# Patient Record
Sex: FEMALE | Race: WHITE | Employment: OTHER | URBAN - METROPOLITAN AREA
[De-identification: names, ages, dates, MRNs, and addresses within clinical notes are randomized per-mention and may not be internally consistent; named-entity substitution may affect disease eponyms.]

---

## 2017-01-11 ENCOUNTER — OFFICE VISIT (OUTPATIENT)
Dept: FAMILY MEDICINE CLINIC | Age: 72
End: 2017-01-11

## 2017-01-11 VITALS
OXYGEN SATURATION: 98 % | DIASTOLIC BLOOD PRESSURE: 72 MMHG | HEART RATE: 55 BPM | RESPIRATION RATE: 16 BRPM | WEIGHT: 163 LBS | SYSTOLIC BLOOD PRESSURE: 118 MMHG | HEIGHT: 66 IN | BODY MASS INDEX: 26.2 KG/M2 | TEMPERATURE: 97.9 F

## 2017-01-11 DIAGNOSIS — E78.5 DYSLIPIDEMIA: Primary | ICD-10-CM

## 2017-01-11 DIAGNOSIS — J02.9 PHARYNGITIS, UNSPECIFIED ETIOLOGY: Primary | ICD-10-CM

## 2017-01-11 LAB — S PYO AG THROAT QL: NORMAL

## 2017-01-11 PROCEDURE — 87880 STREP A ASSAY W/OPTIC: CPT | Performed by: FAMILY MEDICINE

## 2017-01-11 PROCEDURE — 99213 OFFICE O/P EST LOW 20 MIN: CPT | Performed by: FAMILY MEDICINE

## 2017-01-11 RX ORDER — ZOLPIDEM TARTRATE 10 MG/1
TABLET ORAL
Refills: 5 | COMMUNITY
Start: 2016-12-26 | End: 2017-03-23 | Stop reason: SDUPTHER

## 2017-01-11 RX ORDER — SIMVASTATIN 20 MG
20 TABLET ORAL NIGHTLY
Qty: 90 TABLET | Refills: 3 | Status: SHIPPED | OUTPATIENT
Start: 2017-01-11 | End: 2017-01-11 | Stop reason: SDUPTHER

## 2017-01-11 RX ORDER — SIMVASTATIN 20 MG
20 TABLET ORAL NIGHTLY
Qty: 90 TABLET | Refills: 3 | Status: SHIPPED | OUTPATIENT
Start: 2017-01-11 | End: 2019-09-30 | Stop reason: DRUGHIGH

## 2017-01-11 ASSESSMENT — ENCOUNTER SYMPTOMS
COUGH: 1
VOMITING: 0
RHINORRHEA: 1
SORE THROAT: 1
SHORTNESS OF BREATH: 0
DIARRHEA: 0
WHEEZING: 0
SINUS PRESSURE: 1
ABDOMINAL PAIN: 0
NAUSEA: 0
CONSTIPATION: 0

## 2017-01-16 RX ORDER — METOPROLOL SUCCINATE 50 MG/1
TABLET, EXTENDED RELEASE ORAL
Qty: 90 TABLET | OUTPATIENT
Start: 2017-01-16

## 2017-01-26 DIAGNOSIS — R12 HEARTBURN: ICD-10-CM

## 2017-01-26 RX ORDER — RANITIDINE 150 MG/1
TABLET ORAL
Qty: 90 TABLET | Refills: 2 | Status: SHIPPED | OUTPATIENT
Start: 2017-01-26 | End: 2017-03-22

## 2017-02-02 RX ORDER — MEMANTINE HYDROCHLORIDE 28 MG/1
CAPSULE, EXTENDED RELEASE ORAL
Refills: 12 | COMMUNITY
Start: 2017-01-12 | End: 2017-02-03 | Stop reason: DRUGHIGH

## 2017-02-03 ENCOUNTER — OFFICE VISIT (OUTPATIENT)
Dept: CARDIOLOGY | Age: 72
End: 2017-02-03

## 2017-02-03 VITALS
DIASTOLIC BLOOD PRESSURE: 78 MMHG | OXYGEN SATURATION: 97 % | HEIGHT: 66 IN | BODY MASS INDEX: 26.2 KG/M2 | RESPIRATION RATE: 12 BRPM | SYSTOLIC BLOOD PRESSURE: 136 MMHG | HEART RATE: 60 BPM | WEIGHT: 163 LBS

## 2017-02-03 DIAGNOSIS — R01.1 HEART MURMUR: ICD-10-CM

## 2017-02-03 DIAGNOSIS — Z86.73 HISTORY OF TRANSIENT ISCHEMIC ATTACK (TIA): ICD-10-CM

## 2017-02-03 DIAGNOSIS — I10 ESSENTIAL HYPERTENSION: Primary | ICD-10-CM

## 2017-02-03 PROCEDURE — G8427 DOCREV CUR MEDS BY ELIG CLIN: HCPCS | Performed by: INTERNAL MEDICINE

## 2017-02-03 PROCEDURE — 99213 OFFICE O/P EST LOW 20 MIN: CPT | Performed by: INTERNAL MEDICINE

## 2017-02-03 PROCEDURE — 1123F ACP DISCUSS/DSCN MKR DOCD: CPT | Performed by: INTERNAL MEDICINE

## 2017-02-03 PROCEDURE — 1036F TOBACCO NON-USER: CPT | Performed by: INTERNAL MEDICINE

## 2017-02-03 PROCEDURE — 4040F PNEUMOC VAC/ADMIN/RCVD: CPT | Performed by: INTERNAL MEDICINE

## 2017-02-03 PROCEDURE — 1090F PRES/ABSN URINE INCON ASSESS: CPT | Performed by: INTERNAL MEDICINE

## 2017-02-03 PROCEDURE — G8484 FLU IMMUNIZE NO ADMIN: HCPCS | Performed by: INTERNAL MEDICINE

## 2017-02-03 PROCEDURE — G8420 CALC BMI NORM PARAMETERS: HCPCS | Performed by: INTERNAL MEDICINE

## 2017-02-03 PROCEDURE — G8399 PT W/DXA RESULTS DOCUMENT: HCPCS | Performed by: INTERNAL MEDICINE

## 2017-02-03 PROCEDURE — 3014F SCREEN MAMMO DOC REV: CPT | Performed by: INTERNAL MEDICINE

## 2017-02-03 PROCEDURE — 3017F COLORECTAL CA SCREEN DOC REV: CPT | Performed by: INTERNAL MEDICINE

## 2017-02-03 RX ORDER — DONEPEZIL HYDROCHLORIDE 10 MG/1
TABLET, FILM COATED ORAL
Refills: 4 | COMMUNITY
Start: 2017-01-27

## 2017-02-03 RX ORDER — OMEPRAZOLE 20 MG/1
20 CAPSULE, DELAYED RELEASE ORAL DAILY
Qty: 30 CAPSULE | Refills: 0 | Status: SHIPPED | OUTPATIENT
Start: 2017-02-03 | End: 2017-02-03 | Stop reason: SDUPTHER

## 2017-02-03 RX ORDER — PROPRANOLOL HCL 60 MG
60 CAPSULE, EXTENDED RELEASE 24HR ORAL DAILY
Qty: 90 CAPSULE | Refills: 3 | Status: SHIPPED | OUTPATIENT
Start: 2017-02-03 | End: 2019-09-30 | Stop reason: ALTCHOICE

## 2017-02-03 RX ORDER — OMEPRAZOLE 20 MG/1
20 CAPSULE, DELAYED RELEASE ORAL DAILY
Qty: 90 CAPSULE | Refills: 3 | Status: SHIPPED | OUTPATIENT
Start: 2017-02-03 | End: 2017-03-22

## 2017-02-03 ASSESSMENT — ENCOUNTER SYMPTOMS
CHEST TIGHTNESS: 0
COUGH: 0
COLOR CHANGE: 0
APNEA: 0
SHORTNESS OF BREATH: 1

## 2017-03-09 RX ORDER — GABAPENTIN 100 MG/1
100 CAPSULE ORAL DAILY
Qty: 90 CAPSULE | Status: CANCELLED | OUTPATIENT
Start: 2017-03-09

## 2017-03-22 ENCOUNTER — OFFICE VISIT (OUTPATIENT)
Dept: FAMILY MEDICINE CLINIC | Age: 72
End: 2017-03-22

## 2017-03-22 VITALS
SYSTOLIC BLOOD PRESSURE: 126 MMHG | WEIGHT: 161.6 LBS | TEMPERATURE: 97.8 F | HEART RATE: 64 BPM | DIASTOLIC BLOOD PRESSURE: 84 MMHG | BODY MASS INDEX: 26.28 KG/M2 | OXYGEN SATURATION: 94 %

## 2017-03-22 DIAGNOSIS — J01.00 ACUTE NON-RECURRENT MAXILLARY SINUSITIS: Primary | ICD-10-CM

## 2017-03-22 DIAGNOSIS — R41.3 MEMORY LOSS: ICD-10-CM

## 2017-03-22 DIAGNOSIS — H81.10 BPPV (BENIGN PAROXYSMAL POSITIONAL VERTIGO), UNSPECIFIED LATERALITY: ICD-10-CM

## 2017-03-22 PROCEDURE — 1123F ACP DISCUSS/DSCN MKR DOCD: CPT | Performed by: FAMILY MEDICINE

## 2017-03-22 PROCEDURE — G8399 PT W/DXA RESULTS DOCUMENT: HCPCS | Performed by: FAMILY MEDICINE

## 2017-03-22 PROCEDURE — 1090F PRES/ABSN URINE INCON ASSESS: CPT | Performed by: FAMILY MEDICINE

## 2017-03-22 PROCEDURE — 99214 OFFICE O/P EST MOD 30 MIN: CPT | Performed by: FAMILY MEDICINE

## 2017-03-22 PROCEDURE — 1036F TOBACCO NON-USER: CPT | Performed by: FAMILY MEDICINE

## 2017-03-22 PROCEDURE — 3017F COLORECTAL CA SCREEN DOC REV: CPT | Performed by: FAMILY MEDICINE

## 2017-03-22 PROCEDURE — G8484 FLU IMMUNIZE NO ADMIN: HCPCS | Performed by: FAMILY MEDICINE

## 2017-03-22 PROCEDURE — 3014F SCREEN MAMMO DOC REV: CPT | Performed by: FAMILY MEDICINE

## 2017-03-22 PROCEDURE — 4040F PNEUMOC VAC/ADMIN/RCVD: CPT | Performed by: FAMILY MEDICINE

## 2017-03-22 PROCEDURE — G8420 CALC BMI NORM PARAMETERS: HCPCS | Performed by: FAMILY MEDICINE

## 2017-03-22 PROCEDURE — G8427 DOCREV CUR MEDS BY ELIG CLIN: HCPCS | Performed by: FAMILY MEDICINE

## 2017-03-22 RX ORDER — AMOXICILLIN AND CLAVULANATE POTASSIUM 875; 125 MG/1; MG/1
1 TABLET, FILM COATED ORAL 2 TIMES DAILY
Qty: 20 TABLET | Refills: 0 | Status: SHIPPED | OUTPATIENT
Start: 2017-03-22 | End: 2017-04-01

## 2017-03-22 ASSESSMENT — ENCOUNTER SYMPTOMS
DIARRHEA: 0
SINUS PRESSURE: 1
ABDOMINAL PAIN: 0
SHORTNESS OF BREATH: 0
CONSTIPATION: 0
COUGH: 1
WHEEZING: 0
SORE THROAT: 0
RHINORRHEA: 1

## 2017-03-28 ENCOUNTER — OFFICE VISIT (OUTPATIENT)
Dept: PULMONOLOGY | Age: 72
End: 2017-03-28

## 2017-03-28 VITALS
HEIGHT: 65 IN | HEART RATE: 66 BPM | RESPIRATION RATE: 16 BRPM | SYSTOLIC BLOOD PRESSURE: 134 MMHG | WEIGHT: 165 LBS | OXYGEN SATURATION: 95 % | TEMPERATURE: 98.3 F | BODY MASS INDEX: 27.49 KG/M2 | DIASTOLIC BLOOD PRESSURE: 80 MMHG

## 2017-03-28 DIAGNOSIS — R05.3 CHRONIC COUGH: ICD-10-CM

## 2017-03-28 DIAGNOSIS — F51.04 PSYCHOPHYSIOLOGICAL INSOMNIA: Primary | ICD-10-CM

## 2017-03-28 DIAGNOSIS — K21.9 GASTROESOPHAGEAL REFLUX DISEASE, ESOPHAGITIS PRESENCE NOT SPECIFIED: ICD-10-CM

## 2017-03-28 PROCEDURE — 1036F TOBACCO NON-USER: CPT | Performed by: INTERNAL MEDICINE

## 2017-03-28 PROCEDURE — 1123F ACP DISCUSS/DSCN MKR DOCD: CPT | Performed by: INTERNAL MEDICINE

## 2017-03-28 PROCEDURE — 4040F PNEUMOC VAC/ADMIN/RCVD: CPT | Performed by: INTERNAL MEDICINE

## 2017-03-28 PROCEDURE — G8399 PT W/DXA RESULTS DOCUMENT: HCPCS | Performed by: INTERNAL MEDICINE

## 2017-03-28 PROCEDURE — 3014F SCREEN MAMMO DOC REV: CPT | Performed by: INTERNAL MEDICINE

## 2017-03-28 PROCEDURE — G8427 DOCREV CUR MEDS BY ELIG CLIN: HCPCS | Performed by: INTERNAL MEDICINE

## 2017-03-28 PROCEDURE — 1090F PRES/ABSN URINE INCON ASSESS: CPT | Performed by: INTERNAL MEDICINE

## 2017-03-28 PROCEDURE — 99214 OFFICE O/P EST MOD 30 MIN: CPT | Performed by: INTERNAL MEDICINE

## 2017-03-28 PROCEDURE — G8420 CALC BMI NORM PARAMETERS: HCPCS | Performed by: INTERNAL MEDICINE

## 2017-03-28 PROCEDURE — G8484 FLU IMMUNIZE NO ADMIN: HCPCS | Performed by: INTERNAL MEDICINE

## 2017-03-28 PROCEDURE — 3017F COLORECTAL CA SCREEN DOC REV: CPT | Performed by: INTERNAL MEDICINE

## 2017-03-28 RX ORDER — ESOMEPRAZOLE MAGNESIUM 40 MG/1
40 CAPSULE, DELAYED RELEASE ORAL DAILY
Qty: 30 CAPSULE | Refills: 5 | Status: SHIPPED | OUTPATIENT
Start: 2017-03-28 | End: 2017-05-27 | Stop reason: SDUPTHER

## 2017-03-28 RX ORDER — ZOLPIDEM TARTRATE 10 MG/1
10 TABLET ORAL NIGHTLY
Qty: 30 TABLET | Refills: 5 | Status: SHIPPED | OUTPATIENT
Start: 2017-03-28 | End: 2017-04-27

## 2017-03-28 RX ORDER — MEMANTINE HYDROCHLORIDE 10 MG/1
TABLET ORAL
COMMUNITY
Start: 2017-03-13 | End: 2019-09-30 | Stop reason: ALTCHOICE

## 2017-03-28 ASSESSMENT — ENCOUNTER SYMPTOMS
DIARRHEA: 0
WHEEZING: 0
SINUS PRESSURE: 0
COUGH: 1
SORE THROAT: 0
CHEST TIGHTNESS: 1
RHINORRHEA: 0
VOMITING: 0
ABDOMINAL PAIN: 0
SHORTNESS OF BREATH: 0
NAUSEA: 0

## 2017-04-12 RX ORDER — GABAPENTIN 300 MG/1
300 CAPSULE ORAL 3 TIMES DAILY
Qty: 270 CAPSULE | Refills: 3 | Status: SHIPPED | OUTPATIENT
Start: 2017-04-12

## 2017-04-12 RX ORDER — GABAPENTIN 100 MG/1
100 CAPSULE ORAL DAILY
Qty: 90 CAPSULE | Refills: 3 | Status: SHIPPED | OUTPATIENT
Start: 2017-04-12 | End: 2019-09-30 | Stop reason: ALTCHOICE

## 2017-05-27 ENCOUNTER — OFFICE VISIT (OUTPATIENT)
Dept: FAMILY MEDICINE CLINIC | Age: 72
End: 2017-05-27

## 2017-05-27 VITALS
SYSTOLIC BLOOD PRESSURE: 134 MMHG | HEART RATE: 54 BPM | DIASTOLIC BLOOD PRESSURE: 86 MMHG | WEIGHT: 164.2 LBS | OXYGEN SATURATION: 98 % | TEMPERATURE: 97.6 F | BODY MASS INDEX: 27.32 KG/M2

## 2017-05-27 DIAGNOSIS — R05.3 CHRONIC COUGH: ICD-10-CM

## 2017-05-27 DIAGNOSIS — F41.8 DEPRESSION WITH ANXIETY: Primary | ICD-10-CM

## 2017-05-27 DIAGNOSIS — K21.9 GASTROESOPHAGEAL REFLUX DISEASE, ESOPHAGITIS PRESENCE NOT SPECIFIED: ICD-10-CM

## 2017-05-27 PROCEDURE — 99214 OFFICE O/P EST MOD 30 MIN: CPT | Performed by: FAMILY MEDICINE

## 2017-05-27 PROCEDURE — G8420 CALC BMI NORM PARAMETERS: HCPCS | Performed by: FAMILY MEDICINE

## 2017-05-27 PROCEDURE — 3014F SCREEN MAMMO DOC REV: CPT | Performed by: FAMILY MEDICINE

## 2017-05-27 PROCEDURE — G8399 PT W/DXA RESULTS DOCUMENT: HCPCS | Performed by: FAMILY MEDICINE

## 2017-05-27 PROCEDURE — 4040F PNEUMOC VAC/ADMIN/RCVD: CPT | Performed by: FAMILY MEDICINE

## 2017-05-27 PROCEDURE — 1123F ACP DISCUSS/DSCN MKR DOCD: CPT | Performed by: FAMILY MEDICINE

## 2017-05-27 PROCEDURE — G8427 DOCREV CUR MEDS BY ELIG CLIN: HCPCS | Performed by: FAMILY MEDICINE

## 2017-05-27 PROCEDURE — 1090F PRES/ABSN URINE INCON ASSESS: CPT | Performed by: FAMILY MEDICINE

## 2017-05-27 PROCEDURE — 1036F TOBACCO NON-USER: CPT | Performed by: FAMILY MEDICINE

## 2017-05-27 PROCEDURE — 3017F COLORECTAL CA SCREEN DOC REV: CPT | Performed by: FAMILY MEDICINE

## 2017-05-27 RX ORDER — DULOXETIN HYDROCHLORIDE 30 MG/1
30 CAPSULE, DELAYED RELEASE ORAL DAILY
Qty: 30 CAPSULE | Refills: 3 | Status: SHIPPED | OUTPATIENT
Start: 2017-05-27 | End: 2017-07-12

## 2017-05-27 RX ORDER — ESOMEPRAZOLE MAGNESIUM 40 MG/1
40 CAPSULE, DELAYED RELEASE ORAL DAILY
Qty: 30 CAPSULE | Refills: 5 | Status: SHIPPED | OUTPATIENT
Start: 2017-05-27 | End: 2019-09-30 | Stop reason: DRUGHIGH

## 2017-06-03 ASSESSMENT — ENCOUNTER SYMPTOMS
SORE THROAT: 0
DIARRHEA: 0
RHINORRHEA: 0
SHORTNESS OF BREATH: 0
COUGH: 1
ABDOMINAL PAIN: 0
CONSTIPATION: 0
WHEEZING: 0

## 2017-07-07 ENCOUNTER — NURSE ONLY (OUTPATIENT)
Dept: FAMILY MEDICINE CLINIC | Age: 72
End: 2017-07-07

## 2017-07-07 ENCOUNTER — HOSPITAL ENCOUNTER (OUTPATIENT)
Age: 72
Setting detail: SPECIMEN
Discharge: HOME | End: 2017-07-07
Payer: COMMERCIAL

## 2017-07-07 DIAGNOSIS — E78.5 HYPERLIPIDEMIA, UNSPECIFIED HYPERLIPIDEMIA TYPE: ICD-10-CM

## 2017-07-07 DIAGNOSIS — I10 ESSENTIAL HYPERTENSION: ICD-10-CM

## 2017-07-07 DIAGNOSIS — I10 ESSENTIAL HYPERTENSION: Primary | ICD-10-CM

## 2017-07-07 LAB
ALBUMIN SERPL-MCNC: 3.9 G/DL (ref 3.9–4.9)
ALP BLD-CCNC: 88 U/L (ref 40–130)
ALT SERPL-CCNC: 24 U/L (ref 0–33)
ANION GAP SERPL CALCULATED.3IONS-SCNC: 11 MEQ/L (ref 7–13)
AST SERPL-CCNC: 30 U/L (ref 0–35)
BILIRUB SERPL-MCNC: 0.6 MG/DL (ref 0–1.2)
BUN BLDV-MCNC: 25 MG/DL (ref 8–23)
CALCIUM SERPL-MCNC: 9 MG/DL (ref 8.6–10.2)
CHLORIDE BLD-SCNC: 99 MEQ/L (ref 98–107)
CHOLESTEROL, TOTAL: 174 MG/DL (ref 0–199)
CO2: 25 MEQ/L (ref 22–29)
CREAT SERPL-MCNC: 0.81 MG/DL (ref 0.5–0.9)
GFR AFRICAN AMERICAN: >60
GFR NON-AFRICAN AMERICAN: >60
GLOBULIN: 2.8 G/DL (ref 2.3–3.5)
GLUCOSE BLD-MCNC: 103 MG/DL (ref 74–109)
HCT VFR BLD CALC: 44 % (ref 37–47)
HDLC SERPL-MCNC: 42 MG/DL (ref 40–59)
HEMOGLOBIN: 14.5 G/DL (ref 12–16)
LDL CHOLESTEROL CALCULATED: 97 MG/DL (ref 0–129)
MCH RBC QN AUTO: 31.5 PG (ref 27–31.3)
MCHC RBC AUTO-ENTMCNC: 32.9 % (ref 33–37)
MCV RBC AUTO: 95.8 FL (ref 82–100)
PDW BLD-RTO: 13.4 % (ref 11.5–14.5)
PLATELET # BLD: 149 K/UL (ref 130–400)
POTASSIUM SERPL-SCNC: 4.9 MEQ/L (ref 3.5–5.1)
RBC # BLD: 4.6 M/UL (ref 4.2–5.4)
SODIUM BLD-SCNC: 135 MEQ/L (ref 132–144)
TOTAL PROTEIN: 6.7 G/DL (ref 6.4–8.1)
TRIGL SERPL-MCNC: 177 MG/DL (ref 0–200)
WBC # BLD: 4 K/UL (ref 4.8–10.8)

## 2017-07-07 PROCEDURE — 85027 COMPLETE CBC AUTOMATED: CPT

## 2017-07-07 PROCEDURE — 80061 LIPID PANEL: CPT

## 2017-07-07 PROCEDURE — 36415 COLL VENOUS BLD VENIPUNCTURE: CPT | Performed by: FAMILY MEDICINE

## 2017-07-07 PROCEDURE — 80053 COMPREHEN METABOLIC PANEL: CPT

## 2017-07-12 ENCOUNTER — OFFICE VISIT (OUTPATIENT)
Dept: FAMILY MEDICINE CLINIC | Age: 72
End: 2017-07-12

## 2017-07-12 VITALS
DIASTOLIC BLOOD PRESSURE: 60 MMHG | HEART RATE: 56 BPM | OXYGEN SATURATION: 96 % | BODY MASS INDEX: 25.71 KG/M2 | WEIGHT: 160 LBS | HEIGHT: 66 IN | TEMPERATURE: 98.2 F | SYSTOLIC BLOOD PRESSURE: 128 MMHG

## 2017-07-12 DIAGNOSIS — Z12.31 SCREENING MAMMOGRAM, ENCOUNTER FOR: ICD-10-CM

## 2017-07-12 DIAGNOSIS — Z13.31 POSITIVE DEPRESSION SCREENING: ICD-10-CM

## 2017-07-12 DIAGNOSIS — K21.9 GASTROESOPHAGEAL REFLUX DISEASE, ESOPHAGITIS PRESENCE NOT SPECIFIED: ICD-10-CM

## 2017-07-12 DIAGNOSIS — S46.812A TRAPEZIUS STRAIN, LEFT, INITIAL ENCOUNTER: Primary | ICD-10-CM

## 2017-07-12 PROCEDURE — G8399 PT W/DXA RESULTS DOCUMENT: HCPCS | Performed by: FAMILY MEDICINE

## 2017-07-12 PROCEDURE — 1123F ACP DISCUSS/DSCN MKR DOCD: CPT | Performed by: FAMILY MEDICINE

## 2017-07-12 PROCEDURE — 99213 OFFICE O/P EST LOW 20 MIN: CPT | Performed by: FAMILY MEDICINE

## 2017-07-12 PROCEDURE — 1036F TOBACCO NON-USER: CPT | Performed by: FAMILY MEDICINE

## 2017-07-12 PROCEDURE — G8431 POS CLIN DEPRES SCRN F/U DOC: HCPCS | Performed by: FAMILY MEDICINE

## 2017-07-12 PROCEDURE — 1090F PRES/ABSN URINE INCON ASSESS: CPT | Performed by: FAMILY MEDICINE

## 2017-07-12 PROCEDURE — G8419 CALC BMI OUT NRM PARAM NOF/U: HCPCS | Performed by: FAMILY MEDICINE

## 2017-07-12 PROCEDURE — 4040F PNEUMOC VAC/ADMIN/RCVD: CPT | Performed by: FAMILY MEDICINE

## 2017-07-12 PROCEDURE — 3017F COLORECTAL CA SCREEN DOC REV: CPT | Performed by: FAMILY MEDICINE

## 2017-07-12 PROCEDURE — G0444 DEPRESSION SCREEN ANNUAL: HCPCS | Performed by: FAMILY MEDICINE

## 2017-07-12 PROCEDURE — G8427 DOCREV CUR MEDS BY ELIG CLIN: HCPCS | Performed by: FAMILY MEDICINE

## 2017-07-12 PROCEDURE — 3014F SCREEN MAMMO DOC REV: CPT | Performed by: FAMILY MEDICINE

## 2017-07-12 RX ORDER — DULOXETIN HYDROCHLORIDE 60 MG/1
60 CAPSULE, DELAYED RELEASE ORAL DAILY
Qty: 30 CAPSULE | Refills: 3 | Status: SHIPPED | OUTPATIENT
Start: 2017-07-12 | End: 2017-12-03 | Stop reason: SDUPTHER

## 2017-07-12 RX ORDER — NAPROXEN 500 MG/1
500 TABLET ORAL 2 TIMES DAILY WITH MEALS
Qty: 40 TABLET | Refills: 0 | Status: SHIPPED | OUTPATIENT
Start: 2017-07-12 | End: 2019-09-30 | Stop reason: ALTCHOICE

## 2017-07-12 RX ORDER — OMEPRAZOLE 20 MG/1
20 TABLET, DELAYED RELEASE ORAL DAILY
Qty: 30 TABLET | Refills: 3 | Status: SHIPPED | OUTPATIENT
Start: 2017-07-12 | End: 2019-09-30 | Stop reason: DRUGHIGH

## 2017-07-12 ASSESSMENT — ENCOUNTER SYMPTOMS
RHINORRHEA: 0
CONSTIPATION: 0
SHORTNESS OF BREATH: 0
ABDOMINAL PAIN: 0
DIARRHEA: 0
SORE THROAT: 0
WHEEZING: 0
COUGH: 0

## 2017-07-12 ASSESSMENT — PATIENT HEALTH QUESTIONNAIRE - PHQ9
8. MOVING OR SPEAKING SO SLOWLY THAT OTHER PEOPLE COULD HAVE NOTICED. OR THE OPPOSITE, BEING SO FIGETY OR RESTLESS THAT YOU HAVE BEEN MOVING AROUND A LOT MORE THAN USUAL: 0
4. FEELING TIRED OR HAVING LITTLE ENERGY: 3
10. IF YOU CHECKED OFF ANY PROBLEMS, HOW DIFFICULT HAVE THESE PROBLEMS MADE IT FOR YOU TO DO YOUR WORK, TAKE CARE OF THINGS AT HOME, OR GET ALONG WITH OTHER PEOPLE: 2
5. POOR APPETITE OR OVEREATING: 2
7. TROUBLE CONCENTRATING ON THINGS, SUCH AS READING THE NEWSPAPER OR WATCHING TELEVISION: 1
2. FEELING DOWN, DEPRESSED OR HOPELESS: 1
1. LITTLE INTEREST OR PLEASURE IN DOING THINGS: 3
9. THOUGHTS THAT YOU WOULD BE BETTER OFF DEAD, OR OF HURTING YOURSELF: 1
3. TROUBLE FALLING OR STAYING ASLEEP: 1
SUM OF ALL RESPONSES TO PHQ QUESTIONS 1-9: 12
6. FEELING BAD ABOUT YOURSELF - OR THAT YOU ARE A FAILURE OR HAVE LET YOURSELF OR YOUR FAMILY DOWN: 0
SUM OF ALL RESPONSES TO PHQ9 QUESTIONS 1 & 2: 4

## 2017-07-18 ENCOUNTER — HOSPITAL ENCOUNTER (OUTPATIENT)
Dept: WOMENS IMAGING | Age: 72
Discharge: HOME | End: 2017-07-18
Payer: COMMERCIAL

## 2017-07-18 DIAGNOSIS — Z12.31 SCREENING MAMMOGRAM, ENCOUNTER FOR: ICD-10-CM

## 2017-07-18 PROCEDURE — G0202 SCR MAMMO BI INCL CAD: HCPCS

## 2017-07-18 RX ORDER — LOSARTAN POTASSIUM 50 MG/1
TABLET ORAL
Qty: 180 TABLET | Refills: 1 | Status: SHIPPED | OUTPATIENT
Start: 2017-07-18 | End: 2018-01-14 | Stop reason: SDUPTHER

## 2017-09-18 ENCOUNTER — HOSPITAL ENCOUNTER (OUTPATIENT)
Dept: PHYSICAL THERAPY | Age: 72
Setting detail: THERAPIES SERIES
Discharge: HOME | End: 2017-09-18
Payer: COMMERCIAL

## 2017-09-18 PROCEDURE — G8979 MOBILITY GOAL STATUS: HCPCS

## 2017-09-18 PROCEDURE — 97110 THERAPEUTIC EXERCISES: CPT

## 2017-09-18 PROCEDURE — 97162 PT EVAL MOD COMPLEX 30 MIN: CPT

## 2017-09-18 PROCEDURE — G8978 MOBILITY CURRENT STATUS: HCPCS

## 2017-09-18 ASSESSMENT — PAIN SCALES - GENERAL: PAINLEVEL_OUTOF10: 8

## 2017-09-18 ASSESSMENT — PAIN DESCRIPTION - PAIN TYPE: TYPE: ACUTE PAIN

## 2017-09-18 ASSESSMENT — PAIN DESCRIPTION - ORIENTATION: ORIENTATION: LEFT

## 2017-09-18 ASSESSMENT — PAIN DESCRIPTION - DESCRIPTORS: DESCRIPTORS: ACHING;SHARP

## 2017-09-18 ASSESSMENT — PAIN DESCRIPTION - LOCATION: LOCATION: LEG;BACK

## 2017-09-19 ENCOUNTER — HOSPITAL ENCOUNTER (OUTPATIENT)
Dept: PHYSICAL THERAPY | Age: 72
Setting detail: THERAPIES SERIES
Discharge: HOME | End: 2017-09-19
Payer: COMMERCIAL

## 2017-09-19 PROCEDURE — 97110 THERAPEUTIC EXERCISES: CPT

## 2017-09-19 PROCEDURE — 97140 MANUAL THERAPY 1/> REGIONS: CPT

## 2017-09-19 ASSESSMENT — PAIN DESCRIPTION - ORIENTATION: ORIENTATION: LEFT

## 2017-09-19 ASSESSMENT — PAIN SCALES - GENERAL: PAINLEVEL_OUTOF10: 5

## 2017-09-19 ASSESSMENT — PAIN DESCRIPTION - LOCATION: LOCATION: BACK;LEG

## 2017-09-20 DIAGNOSIS — F51.04 PSYCHOPHYSIOLOGICAL INSOMNIA: Primary | ICD-10-CM

## 2017-09-25 RX ORDER — ZOLPIDEM TARTRATE 10 MG/1
TABLET ORAL
Qty: 30 TABLET | Refills: 3 | Status: SHIPPED | OUTPATIENT
Start: 2017-09-25 | End: 2018-01-23 | Stop reason: SDUPTHER

## 2017-09-27 ENCOUNTER — HOSPITAL ENCOUNTER (OUTPATIENT)
Dept: PHYSICAL THERAPY | Age: 72
Setting detail: THERAPIES SERIES
Discharge: HOME | End: 2017-09-27
Payer: COMMERCIAL

## 2017-10-31 ENCOUNTER — OFFICE VISIT (OUTPATIENT)
Dept: PULMONOLOGY | Age: 72
End: 2017-10-31

## 2017-10-31 VITALS
OXYGEN SATURATION: 95 % | DIASTOLIC BLOOD PRESSURE: 70 MMHG | HEIGHT: 66 IN | SYSTOLIC BLOOD PRESSURE: 124 MMHG | WEIGHT: 170 LBS | BODY MASS INDEX: 27.32 KG/M2 | TEMPERATURE: 98 F | HEART RATE: 58 BPM

## 2017-10-31 DIAGNOSIS — R05.3 CHRONIC COUGH: ICD-10-CM

## 2017-10-31 DIAGNOSIS — G47.00 INSOMNIA, UNSPECIFIED TYPE: Primary | ICD-10-CM

## 2017-10-31 PROCEDURE — 3014F SCREEN MAMMO DOC REV: CPT | Performed by: PHYSICIAN ASSISTANT

## 2017-10-31 PROCEDURE — 1090F PRES/ABSN URINE INCON ASSESS: CPT | Performed by: PHYSICIAN ASSISTANT

## 2017-10-31 PROCEDURE — G8417 CALC BMI ABV UP PARAM F/U: HCPCS | Performed by: PHYSICIAN ASSISTANT

## 2017-10-31 PROCEDURE — 1123F ACP DISCUSS/DSCN MKR DOCD: CPT | Performed by: PHYSICIAN ASSISTANT

## 2017-10-31 PROCEDURE — 3017F COLORECTAL CA SCREEN DOC REV: CPT | Performed by: PHYSICIAN ASSISTANT

## 2017-10-31 PROCEDURE — G8399 PT W/DXA RESULTS DOCUMENT: HCPCS | Performed by: PHYSICIAN ASSISTANT

## 2017-10-31 PROCEDURE — G8484 FLU IMMUNIZE NO ADMIN: HCPCS | Performed by: PHYSICIAN ASSISTANT

## 2017-10-31 PROCEDURE — G8427 DOCREV CUR MEDS BY ELIG CLIN: HCPCS | Performed by: PHYSICIAN ASSISTANT

## 2017-10-31 PROCEDURE — 99213 OFFICE O/P EST LOW 20 MIN: CPT | Performed by: PHYSICIAN ASSISTANT

## 2017-10-31 PROCEDURE — 4040F PNEUMOC VAC/ADMIN/RCVD: CPT | Performed by: PHYSICIAN ASSISTANT

## 2017-10-31 PROCEDURE — 1036F TOBACCO NON-USER: CPT | Performed by: PHYSICIAN ASSISTANT

## 2017-10-31 RX ORDER — ACETAMINOPHEN 325 MG/1
650 TABLET ORAL EVERY 6 HOURS PRN
COMMUNITY

## 2017-10-31 RX ORDER — PANTOPRAZOLE SODIUM 40 MG/1
40 TABLET, DELAYED RELEASE ORAL DAILY
COMMUNITY
End: 2019-09-30 | Stop reason: DRUGHIGH

## 2017-10-31 ASSESSMENT — ENCOUNTER SYMPTOMS
SORE THROAT: 0
SINUS PRESSURE: 0
ABDOMINAL PAIN: 0
RHINORRHEA: 0
COUGH: 1
SINUS PAIN: 0
BACK PAIN: 1

## 2017-10-31 NOTE — PROGRESS NOTES
[address] [facility] 71 y.o. female presents 10/31/17 with   Chief Complaint   Patient presents with    Insomnia     follow up     HPI  This is a 71year old patient of Dr. Wign Caceres that he has been seeing for insomnia and still reports chronic cough. Patient is a non smoker was was exposed to second hand smoke years ago as a child. Patient has been on ambien for a long time and this is the only way she can sleep at bedtime. In addition, patient also reports some left posterior knee pain and reports that she had a baker's cyst in the past. Patient did however travel on a plane ride about 2 weeks ago and states that this was a 4 hour plane ride. Reviewed the following history:    Past Medical History:   Diagnosis Date    Allergic rhinitis 10/24/2011    At risk for bone density loss 2009    hx normal    Back pain     Broken ankle 2/2015    left    Colon polyps     colonoscopy 2008 (polyps) and 1/12 neg.  Cough 5/11/2015    DJD (degenerative joint disease) of cervical spine     also r shoulder    FH: colon cancer maternal age 68    Gout     on chronic Mobic! DC'ed mobic 12/11.  Heart murmur     lifelong-echo ok    History of bone density study approx 2009    Hyperlipidemia     2009, hx good control.  Hypertension     age 28, hx good control    Knee pain     right knee    Memory loss     onset august, 2011    Neuropathy [facility])     feet left > right    Osteoarthrosis 2/2/2012    Raynaud disease     Raynaud's phenomenon 2/2/2012    TIA (transient ischemic attack) 2009    transient los vision uscarotids, CT OK. Rx asa c no reoccurence. Past Surgical History:   Procedure Laterality Date    BACK SURGERY  160093    Laminectomy L5?    CERVICAL DISCECTOMY      pt denies surgery    COLONOSCOPY      having Mon 12/05/2011-neg. hx polyps    COLONOSCOPY      maternal colon ca; pt has hx polyps.     SHOULDER SURGERY      right shoulder    TOE SURGERY      joint replacement, great toe, mouth daily 30 tablet 11    melatonin 3 MG TABS tablet Take 1 tablet by mouth daily 30 tablet 3    fluocinonide (LIDEX) 0.05 % cream Apply topically 2 times daily. 3 Tube 3    omeprazole (PRILOSEC OTC) 20 MG tablet Take 1 tablet by mouth daily 30 tablet 3    naproxen (NAPROSYN) 500 MG tablet Take 1 tablet by mouth 2 times daily (with meals) 40 tablet 0    esomeprazole (NEXIUM) 40 MG delayed release capsule Take 1 capsule by mouth daily 30 capsule 5    donepezil (ARICEPT) 10 MG tablet TAKE 1 TABLET BY MOUTH DAILY  4    fluticasone (FLONASE) 50 MCG/ACT nasal spray 1 spray by Nasal route daily 1 Bottle 3    Glycopyrrolate (ROBINUL-FORTE PO) Take by mouth Taking 1/4 tablet daily. No current facility-administered medications for this visit. Review of Systems   HENT: Negative for postnasal drip, rhinorrhea, sinus pain, sinus pressure, sneezing and sore throat. Eyes: Negative for visual disturbance. Respiratory: Positive for cough (chronic and dry for 9 months). Cardiovascular: Negative for chest pain. Gastrointestinal: Negative for abdominal pain. Endocrine: Negative for polyuria. Genitourinary: Negative for dysuria. Musculoskeletal: Positive for arthralgias (left posterior leg pain), back pain and joint swelling (left knee posterior ). Negative for gait problem. Neurological: Negative for dizziness and headaches. Objective    Vitals:    10/31/17 1602   BP: 124/70   Site: Left Arm   Position: Sitting   Cuff Size: Medium Adult   Pulse: 58   Temp: 98 °F (36.7 °C)   TempSrc: Tympanic   SpO2: 95%   Weight: 170 lb (77.1 kg)   Height: 5' 5.75\" (1.67 m)       Physical Exam   Constitutional: She is oriented to person, place, and time. She appears well-developed and well-nourished. No distress. HENT:   Head: Normocephalic and atraumatic. Right Ear: External ear normal.   Left Ear: External ear normal.   Mouth/Throat: No oropharyngeal exudate.    Eyes: Pupils are equal, round, and reactive to light. Neck: Normal range of motion. Cardiovascular: Normal rate and regular rhythm. Exam reveals no gallop and no friction rub. No murmur heard. Pulmonary/Chest: Effort normal and breath sounds normal. No respiratory distress. She has no wheezes. She has no rales. She exhibits no tenderness. Abdominal: Soft. Musculoskeletal: She exhibits tenderness (posterior left knee pain with mild swelling in comparison to the right ). Pt is NVI   Lymphadenopathy:     She has no cervical adenopathy. Neurological: She is alert and oriented to person, place, and time. Skin: She is not diaphoretic. Psychiatric: She has a normal mood and affect. Assessment and Plan      ICD-10-CM ICD-9-CM    1. Insomnia, unspecified type G47.00 780.52    2. Chronic cough R05 786.2 XR CHEST STANDARD (2 VW)      Pulse oximetry, overnight       Orders Placed This Encounter   Procedures    XR CHEST STANDARD (2 VW)     Standing Status:   Future     Standing Expiration Date:   10/31/2018     Order Specific Question:   Reason for exam:     Answer:   cough 1 year    Pulse oximetry, overnight     Standing Status:   Future     Standing Expiration Date:   11/1/2018     Long discussion with the patient that I would advise and would be happy to order a STAT US of the left lower extremity to rule out DVT. Advised that my suspicion is low, but with her recent travel history she is at risk. Patient states that she had a bakers cyst in the past and this feels the same. I advised patient that the only way to know for sure would be to obtain the STAT US. Patient REFUSED testing at this time. She is aware of the risk of not looking into this and wishes to proceed. She reports she will follow up with a provider if it does not resolve. Consulted with Dr. Boubacar Moses regarding chronic cough. Will proceed with CXR and overnight pulse ox testing to rule out sleep apnea as a possible cause of her chronic cough.  Will continue [facility] as before. Patient will follow up in 3 months due to cough and testing. If symptoms rapidly worsen patient is advised to go to the emergency room. Reviewed with the patient: current clinical status, medications, activities and diet. Side effects, adverse effects of the medication prescribed today, as well as treatment plan and result expectations have been discussed with the patient who expresses understanding and desires to proceed. Close follow up to evaluate treatment results and for coordination of care. I have reviewed the patient's medical history in detail and updated the computerized patient record. Return in about 3 months (around 1/31/2018) for re-evaluation.     Marija Bynum PA-C

## 2017-12-03 DIAGNOSIS — Z13.31 POSITIVE DEPRESSION SCREENING: ICD-10-CM

## 2017-12-03 DIAGNOSIS — R39.15 URINARY URGENCY: ICD-10-CM

## 2017-12-03 DIAGNOSIS — R61 HYPERHIDROSIS: ICD-10-CM

## 2017-12-04 RX ORDER — OXYBUTYNIN CHLORIDE 10 MG/1
10 TABLET, EXTENDED RELEASE ORAL DAILY
Qty: 30 TABLET | Refills: 5 | Status: SHIPPED | OUTPATIENT
Start: 2017-12-04 | End: 2017-12-27 | Stop reason: SDUPTHER

## 2017-12-04 RX ORDER — DULOXETIN HYDROCHLORIDE 60 MG/1
60 CAPSULE, DELAYED RELEASE ORAL DAILY
Qty: 30 CAPSULE | Refills: 5 | Status: SHIPPED | OUTPATIENT
Start: 2017-12-04 | End: 2018-07-06 | Stop reason: SDUPTHER

## 2017-12-04 NOTE — TELEPHONE ENCOUNTER
Medication: Cymbalta & Ditropan    Last office visit: 7/12/17    Last labs: 7/7/17    Last filled: Cymbalta: 7/12/17 & Ditropan: 12/9/16

## 2017-12-27 DIAGNOSIS — R39.15 URINARY URGENCY: ICD-10-CM

## 2017-12-27 DIAGNOSIS — R61 HYPERHIDROSIS: ICD-10-CM

## 2017-12-27 RX ORDER — OXYBUTYNIN CHLORIDE 10 MG/1
10 TABLET, EXTENDED RELEASE ORAL DAILY
Qty: 30 TABLET | Refills: 5 | Status: SHIPPED | OUTPATIENT
Start: 2017-12-27 | End: 2018-09-04 | Stop reason: SDUPTHER

## 2017-12-27 NOTE — TELEPHONE ENCOUNTER
Medication: Oxybutynin    Last office visit: 7/12/2017    Last labs: 7/7/2017    Last filled: 12/4/2017

## 2018-01-04 ENCOUNTER — TELEPHONE (OUTPATIENT)
Dept: PULMONOLOGY | Age: 73
End: 2018-01-04

## 2018-01-04 DIAGNOSIS — G47.34 NOCTURNAL HYPOXIA: Primary | ICD-10-CM

## 2018-01-04 NOTE — TELEPHONE ENCOUNTER
Patient calling to get test results (x-rays ordered by Elder Richardson). She stated that its been a while and nobody has called her for the results. Please advice and thank you!

## 2018-01-09 NOTE — TELEPHONE ENCOUNTER
Spoke with patient regarding test results and it does show oxygen desaturation that would be worth looking into the possibility of LEENA. Patient is agreeable for a home sleep study. We will order this and then have patient follow up with Dr. Fredo Masterson after this testing. Patient will also bring in a copy of her CXR for us to review from [facility]. Report is scanned into the system.      Sandra Simons

## 2018-01-15 RX ORDER — LOSARTAN POTASSIUM 50 MG/1
TABLET ORAL
Qty: 180 TABLET | Refills: 1 | Status: SHIPPED | OUTPATIENT
Start: 2018-01-15 | End: 2019-09-30 | Stop reason: ALTCHOICE

## 2018-01-23 DIAGNOSIS — F51.04 PSYCHOPHYSIOLOGICAL INSOMNIA: ICD-10-CM

## 2018-01-23 RX ORDER — ZOLPIDEM TARTRATE 10 MG/1
5 TABLET ORAL NIGHTLY PRN
Qty: 30 TABLET | Refills: 3 | Status: SHIPPED | OUTPATIENT
Start: 2018-01-23 | End: 2018-01-25 | Stop reason: SDUPTHER

## 2018-01-25 DIAGNOSIS — F51.04 PSYCHOPHYSIOLOGICAL INSOMNIA: ICD-10-CM

## 2018-01-25 RX ORDER — ZOLPIDEM TARTRATE 10 MG/1
5 TABLET ORAL NIGHTLY PRN
Qty: 30 TABLET | Refills: 3 | Status: SHIPPED | OUTPATIENT
Start: 2018-01-25 | End: 2018-02-26 | Stop reason: SDUPTHER

## 2018-02-23 ENCOUNTER — TELEPHONE (OUTPATIENT)
Dept: PULMONOLOGY | Age: 73
End: 2018-02-23

## 2018-02-26 DIAGNOSIS — F51.04 PSYCHOPHYSIOLOGICAL INSOMNIA: ICD-10-CM

## 2018-02-26 RX ORDER — ZOLPIDEM TARTRATE 10 MG/1
TABLET ORAL
Qty: 30 TABLET | Refills: 3 | Status: SHIPPED | OUTPATIENT
Start: 2018-02-26 | End: 2019-09-30 | Stop reason: ALTCHOICE

## 2018-07-06 DIAGNOSIS — Z13.31 POSITIVE DEPRESSION SCREENING: ICD-10-CM

## 2018-07-06 RX ORDER — DULOXETIN HYDROCHLORIDE 60 MG/1
60 CAPSULE, DELAYED RELEASE ORAL DAILY
Qty: 30 CAPSULE | Refills: 2 | Status: SHIPPED | OUTPATIENT
Start: 2018-07-06 | End: 2019-09-30 | Stop reason: ALTCHOICE

## 2018-09-04 DIAGNOSIS — R39.15 URINARY URGENCY: ICD-10-CM

## 2018-09-04 DIAGNOSIS — R61 HYPERHIDROSIS: ICD-10-CM

## 2018-09-04 NOTE — TELEPHONE ENCOUNTER
Pt has not been seen in over a year. She says she will call back to make an appointment but is out of her oxybutynin. Are you willing to fill?

## 2018-09-05 RX ORDER — OXYBUTYNIN CHLORIDE 10 MG/1
10 TABLET, EXTENDED RELEASE ORAL DAILY
Qty: 30 TABLET | Refills: 0 | Status: SHIPPED | OUTPATIENT
Start: 2018-09-05

## 2018-10-17 ENCOUNTER — HOSPITAL ENCOUNTER (OUTPATIENT)
Dept: PHYSICAL THERAPY | Age: 73
Setting detail: THERAPIES SERIES
Discharge: HOME | End: 2018-10-17
Payer: COMMERCIAL

## 2018-10-17 PROCEDURE — 97110 THERAPEUTIC EXERCISES: CPT

## 2018-10-17 PROCEDURE — 97530 THERAPEUTIC ACTIVITIES: CPT

## 2018-10-17 PROCEDURE — 97162 PT EVAL MOD COMPLEX 30 MIN: CPT

## 2018-10-17 ASSESSMENT — PAIN DESCRIPTION - ORIENTATION: ORIENTATION: RIGHT;LEFT

## 2018-10-17 ASSESSMENT — PAIN DESCRIPTION - LOCATION: LOCATION: BACK

## 2018-10-17 ASSESSMENT — PAIN DESCRIPTION - DESCRIPTORS: DESCRIPTORS: ACHING;DULL

## 2018-10-17 ASSESSMENT — PAIN SCALES - GENERAL: PAINLEVEL_OUTOF10: 5

## 2018-10-17 ASSESSMENT — PAIN DESCRIPTION - PAIN TYPE: TYPE: CHRONIC PAIN

## 2018-10-17 NOTE — PROGRESS NOTES
4+/5 so pain can resume her normal activity level painfree  Long term goal 3: Pt able to to do SLS for >10 seconds without UE support to decrease her risk for falls  Long term goal 4: Improve Oswestry to <20% or better  Long term goal 5:  Indep with HEP  Patient Goals   Patient goals : Be able to get rid of her back pain       Therapy Time   Individual Concurrent Group Co-treatment   Time In  11:00am         Time Out  12:10pm         Minutes  70 min                 DOYLE Taylor#[id number]

## 2018-10-24 ENCOUNTER — HOSPITAL ENCOUNTER (OUTPATIENT)
Dept: PHYSICAL THERAPY | Age: 73
Setting detail: THERAPIES SERIES
Discharge: HOME | End: 2018-10-24
Payer: COMMERCIAL

## 2018-10-24 PROCEDURE — G0283 ELEC STIM OTHER THAN WOUND: HCPCS

## 2018-10-24 PROCEDURE — 97110 THERAPEUTIC EXERCISES: CPT

## 2018-10-24 PROCEDURE — 97530 THERAPEUTIC ACTIVITIES: CPT

## 2018-10-24 ASSESSMENT — PAIN DESCRIPTION - PAIN TYPE: TYPE: ACUTE PAIN

## 2018-10-24 ASSESSMENT — PAIN DESCRIPTION - ORIENTATION: ORIENTATION: MID

## 2018-10-24 ASSESSMENT — PAIN DESCRIPTION - LOCATION: LOCATION: BACK

## 2018-10-26 ENCOUNTER — HOSPITAL ENCOUNTER (OUTPATIENT)
Dept: PHYSICAL THERAPY | Age: 73
Setting detail: THERAPIES SERIES
Discharge: HOME | End: 2018-10-26
Payer: COMMERCIAL

## 2018-10-26 PROCEDURE — 97110 THERAPEUTIC EXERCISES: CPT

## 2018-10-26 PROCEDURE — 97530 THERAPEUTIC ACTIVITIES: CPT

## 2018-10-26 PROCEDURE — 97140 MANUAL THERAPY 1/> REGIONS: CPT

## 2018-10-26 ASSESSMENT — PAIN DESCRIPTION - ORIENTATION: ORIENTATION: MID;LOWER;LEFT

## 2018-10-26 ASSESSMENT — PAIN SCALES - GENERAL: PAINLEVEL_OUTOF10: 6

## 2018-10-26 ASSESSMENT — PAIN DESCRIPTION - LOCATION: LOCATION: BACK

## 2018-10-31 ENCOUNTER — HOSPITAL ENCOUNTER (OUTPATIENT)
Dept: PHYSICAL THERAPY | Age: 73
Setting detail: THERAPIES SERIES
Discharge: HOME | End: 2018-10-31
Payer: COMMERCIAL

## 2018-10-31 PROCEDURE — 97140 MANUAL THERAPY 1/> REGIONS: CPT

## 2018-10-31 PROCEDURE — 97110 THERAPEUTIC EXERCISES: CPT

## 2018-10-31 ASSESSMENT — PAIN SCALES - GENERAL: PAINLEVEL_OUTOF10: 3

## 2018-10-31 ASSESSMENT — PAIN DESCRIPTION - ORIENTATION: ORIENTATION: MID

## 2018-10-31 ASSESSMENT — PAIN DESCRIPTION - LOCATION: LOCATION: BACK

## 2018-10-31 ASSESSMENT — PAIN DESCRIPTION - PAIN TYPE: TYPE: ACUTE PAIN

## 2018-11-02 ENCOUNTER — HOSPITAL ENCOUNTER (OUTPATIENT)
Dept: PHYSICAL THERAPY | Age: 73
Setting detail: THERAPIES SERIES
Discharge: HOME | End: 2018-11-02
Payer: COMMERCIAL

## 2018-11-02 PROCEDURE — 97110 THERAPEUTIC EXERCISES: CPT

## 2018-11-02 PROCEDURE — 97140 MANUAL THERAPY 1/> REGIONS: CPT

## 2018-11-02 ASSESSMENT — PAIN SCALES - GENERAL: PAINLEVEL_OUTOF10: 4

## 2018-11-02 ASSESSMENT — PAIN DESCRIPTION - PAIN TYPE: TYPE: ACUTE PAIN

## 2018-11-02 ASSESSMENT — PAIN DESCRIPTION - ORIENTATION: ORIENTATION: LOWER

## 2018-11-02 ASSESSMENT — PAIN DESCRIPTION - LOCATION: LOCATION: BACK

## 2018-11-06 ENCOUNTER — HOSPITAL ENCOUNTER (OUTPATIENT)
Dept: PHYSICAL THERAPY | Age: 73
Setting detail: THERAPIES SERIES
Discharge: HOME | End: 2018-11-06
Payer: COMMERCIAL

## 2018-11-06 PROCEDURE — 97140 MANUAL THERAPY 1/> REGIONS: CPT

## 2018-11-06 PROCEDURE — 97110 THERAPEUTIC EXERCISES: CPT

## 2018-11-06 ASSESSMENT — PAIN DESCRIPTION - LOCATION: LOCATION: BACK

## 2018-11-06 ASSESSMENT — PAIN DESCRIPTION - ORIENTATION: ORIENTATION: LOWER

## 2018-11-06 ASSESSMENT — PAIN SCALES - GENERAL: PAINLEVEL_OUTOF10: 4

## 2018-11-06 ASSESSMENT — PAIN DESCRIPTION - PAIN TYPE: TYPE: ACUTE PAIN

## 2018-11-08 ENCOUNTER — HOSPITAL ENCOUNTER (OUTPATIENT)
Dept: PHYSICAL THERAPY | Age: 73
Setting detail: THERAPIES SERIES
Discharge: HOME | End: 2018-11-08
Payer: COMMERCIAL

## 2018-11-13 ENCOUNTER — HOSPITAL ENCOUNTER (OUTPATIENT)
Dept: PHYSICAL THERAPY | Age: 73
Setting detail: THERAPIES SERIES
Discharge: HOME | End: 2018-11-13
Payer: COMMERCIAL

## 2018-11-15 ENCOUNTER — HOSPITAL ENCOUNTER (OUTPATIENT)
Dept: PHYSICAL THERAPY | Age: 73
Setting detail: THERAPIES SERIES
Discharge: HOME | End: 2018-11-15
Payer: COMMERCIAL

## 2018-11-15 NOTE — PROGRESS NOTES
training, Neuromuscular Re-education, Manual Therapy - Soft Tissue Mobilization, Manual Therapy - Joint Manipulation, Pain Management, Home Exercise Program, Modalities  Plan Comment: Estim, US, KT tape, CP/HP, Hx of back surgery - unsure if fusion so no Lumbar traction as this        Therapy Time   Individual Concurrent Group Co-treatment   Time In           Time Out           Minutes  0-cx'd                 Lalla Sensor, PTA  License and [address] Number: [id number]

## 2019-09-30 ENCOUNTER — OFFICE VISIT (OUTPATIENT)
Dept: CARDIOLOGY CLINIC | Age: 74
End: 2019-09-30
Payer: COMMERCIAL

## 2019-09-30 VITALS
RESPIRATION RATE: 12 BRPM | HEIGHT: 66 IN | WEIGHT: 170 LBS | SYSTOLIC BLOOD PRESSURE: 122 MMHG | DIASTOLIC BLOOD PRESSURE: 70 MMHG | HEART RATE: 59 BPM | BODY MASS INDEX: 27.32 KG/M2

## 2019-09-30 DIAGNOSIS — I10 ESSENTIAL HYPERTENSION: ICD-10-CM

## 2019-09-30 DIAGNOSIS — Z86.73 HISTORY OF TRANSIENT ISCHEMIC ATTACK (TIA): ICD-10-CM

## 2019-09-30 DIAGNOSIS — R01.1 HEART MURMUR: Primary | ICD-10-CM

## 2019-09-30 DIAGNOSIS — Z01.810 PREOP CARDIOVASCULAR EXAM: ICD-10-CM

## 2019-09-30 DIAGNOSIS — R41.3 MEMORY LOSS: ICD-10-CM

## 2019-09-30 PROCEDURE — 1090F PRES/ABSN URINE INCON ASSESS: CPT | Performed by: INTERNAL MEDICINE

## 2019-09-30 PROCEDURE — G8419 CALC BMI OUT NRM PARAM NOF/U: HCPCS | Performed by: INTERNAL MEDICINE

## 2019-09-30 PROCEDURE — G8427 DOCREV CUR MEDS BY ELIG CLIN: HCPCS | Performed by: INTERNAL MEDICINE

## 2019-09-30 PROCEDURE — 99243 OFF/OP CNSLTJ NEW/EST LOW 30: CPT | Performed by: INTERNAL MEDICINE

## 2019-09-30 RX ORDER — TELMISARTAN AND AMLODIPINE 10; 40 MG/1; MG/1
TABLET ORAL
Refills: 0 | COMMUNITY
Start: 2019-07-06

## 2019-09-30 RX ORDER — AMANTADINE HYDROCHLORIDE 100 MG/1
TABLET ORAL
COMMUNITY
Start: 2019-09-27 | End: 2019-09-30 | Stop reason: ALTCHOICE

## 2019-09-30 RX ORDER — OMEPRAZOLE 20 MG/1
CAPSULE, DELAYED RELEASE ORAL
Refills: 1 | COMMUNITY
Start: 2019-09-18 | End: 2019-09-30 | Stop reason: ALTCHOICE

## 2019-09-30 RX ORDER — MELOXICAM 15 MG/1
TABLET ORAL
Refills: 3 | COMMUNITY
Start: 2019-08-12 | End: 2019-09-30 | Stop reason: ALTCHOICE

## 2019-09-30 RX ORDER — METOPROLOL SUCCINATE 25 MG/1
12.5 TABLET, EXTENDED RELEASE ORAL DAILY
Refills: 1 | COMMUNITY
Start: 2019-08-26

## 2019-09-30 RX ORDER — ATORVASTATIN CALCIUM 10 MG/1
TABLET, FILM COATED ORAL
Refills: 0 | COMMUNITY
Start: 2019-09-25

## 2019-09-30 RX ORDER — POLYETHYLENE GLYCOL 3350 17 G/17G
17 POWDER ORAL DAILY
COMMUNITY

## 2019-09-30 RX ORDER — METHOCARBAMOL 500 MG/1
TABLET, FILM COATED ORAL
Refills: 3 | COMMUNITY
Start: 2019-09-10

## 2019-09-30 RX ORDER — PHENOL 1.4 %
1 AEROSOL, SPRAY (ML) MUCOUS MEMBRANE NIGHTLY
COMMUNITY

## 2019-09-30 RX ORDER — CHOLECALCIFEROL (VITAMIN D3) 25 MCG
1 TABLET ORAL DAILY
COMMUNITY

## 2019-09-30 RX ORDER — LANOLIN ALCOHOL/MO/W.PET/CERES
1000 CREAM (GRAM) TOPICAL DAILY
COMMUNITY

## 2019-09-30 RX ORDER — MULTIVITAMIN WITH IRON
100 TABLET ORAL DAILY
COMMUNITY

## 2019-09-30 ASSESSMENT — ENCOUNTER SYMPTOMS
TROUBLE SWALLOWING: 0
SHORTNESS OF BREATH: 0
VOMITING: 0
VOICE CHANGE: 0
APNEA: 0
COLOR CHANGE: 0
ABDOMINAL PAIN: 0
COUGH: 0
BLOOD IN STOOL: 0
FACIAL SWELLING: 0
ABDOMINAL DISTENTION: 0
ANAL BLEEDING: 0
DIARRHEA: 0
CHEST TIGHTNESS: 0
WHEEZING: 0
NAUSEA: 0

## 2023-01-17 ENCOUNTER — APPOINTMENT (OUTPATIENT)
Dept: GENERAL RADIOLOGY | Age: 78
End: 2023-01-17
Payer: MEDICARE

## 2023-01-17 ENCOUNTER — HOSPITAL ENCOUNTER (EMERGENCY)
Age: 78
Discharge: HOME | End: 2023-01-17
Attending: EMERGENCY MEDICINE
Payer: MEDICARE

## 2023-01-17 ENCOUNTER — APPOINTMENT (OUTPATIENT)
Dept: CT IMAGING | Age: 78
End: 2023-01-17
Payer: MEDICARE

## 2023-01-17 VITALS
DIASTOLIC BLOOD PRESSURE: 82 MMHG | HEART RATE: 70 BPM | HEIGHT: 66 IN | RESPIRATION RATE: 16 BRPM | TEMPERATURE: 97.9 F | WEIGHT: 165 LBS | BODY MASS INDEX: 26.52 KG/M2 | OXYGEN SATURATION: 99 % | SYSTOLIC BLOOD PRESSURE: 120 MMHG

## 2023-01-17 DIAGNOSIS — S70.02XA CONTUSION OF LEFT HIP, INITIAL ENCOUNTER: ICD-10-CM

## 2023-01-17 DIAGNOSIS — S09.90XA INJURY OF HEAD, INITIAL ENCOUNTER: Primary | ICD-10-CM

## 2023-01-17 PROCEDURE — 72125 CT NECK SPINE W/O DYE: CPT

## 2023-01-17 PROCEDURE — 73502 X-RAY EXAM HIP UNI 2-3 VIEWS: CPT

## 2023-01-17 PROCEDURE — 70450 CT HEAD/BRAIN W/O DYE: CPT

## 2023-01-17 PROCEDURE — 73564 X-RAY EXAM KNEE 4 OR MORE: CPT

## 2023-01-17 PROCEDURE — 99284 EMERGENCY DEPT VISIT MOD MDM: CPT

## 2023-01-17 PROCEDURE — 6370000000 HC RX 637 (ALT 250 FOR IP): Performed by: EMERGENCY MEDICINE

## 2023-01-17 RX ORDER — BUTALBITAL, ACETAMINOPHEN AND CAFFEINE 300; 40; 50 MG/1; MG/1; MG/1
2 CAPSULE ORAL ONCE
Status: COMPLETED | OUTPATIENT
Start: 2023-01-17 | End: 2023-01-17

## 2023-01-17 RX ORDER — LOSARTAN POTASSIUM 25 MG/1
25 TABLET ORAL DAILY
COMMUNITY

## 2023-01-17 RX ORDER — OMEPRAZOLE 20 MG/1
20 CAPSULE, DELAYED RELEASE ORAL DAILY
COMMUNITY

## 2023-01-17 RX ORDER — AMANTADINE HYDROCHLORIDE 100 MG/1
100 CAPSULE, GELATIN COATED ORAL 2 TIMES DAILY
COMMUNITY

## 2023-01-17 RX ORDER — CYCLOBENZAPRINE HCL 5 MG
5 TABLET ORAL 2 TIMES DAILY PRN
COMMUNITY

## 2023-01-17 RX ADMIN — BUTALBITA,ACETAMINOPHEN AND CAFFEINE 2 CAPSULE: 50; 300; 40 CAPSULE ORAL at 20:02

## 2023-01-17 ASSESSMENT — PAIN SCALES - GENERAL
PAINLEVEL_OUTOF10: 2
PAINLEVEL_OUTOF10: 6

## 2023-01-17 ASSESSMENT — ENCOUNTER SYMPTOMS
NAUSEA: 0
COLOR CHANGE: 0
SORE THROAT: 0
DIARRHEA: 0
EYE PAIN: 0
SINUS PAIN: 0
ABDOMINAL PAIN: 0
BACK PAIN: 0
SHORTNESS OF BREATH: 0
VOMITING: 0
COUGH: 0

## 2023-01-17 ASSESSMENT — PAIN DESCRIPTION - FREQUENCY: FREQUENCY: CONTINUOUS

## 2023-01-17 ASSESSMENT — PAIN DESCRIPTION - PAIN TYPE: TYPE: ACUTE PAIN

## 2023-01-17 ASSESSMENT — PAIN DESCRIPTION - LOCATION: LOCATION: HEAD

## 2023-01-17 ASSESSMENT — PAIN DESCRIPTION - DESCRIPTORS: DESCRIPTORS: ACHING

## 2023-01-17 ASSESSMENT — PAIN - FUNCTIONAL ASSESSMENT: PAIN_FUNCTIONAL_ASSESSMENT: 0-10

## 2023-01-18 ENCOUNTER — HOSPITAL ENCOUNTER (EMERGENCY)
Age: 78
Discharge: NURSING, REHAB OR LONG-TERM CARE | End: 2023-01-18
Attending: EMERGENCY MEDICINE
Payer: MEDICARE

## 2023-01-18 ENCOUNTER — APPOINTMENT (OUTPATIENT)
Dept: GENERAL RADIOLOGY | Age: 78
End: 2023-01-18
Payer: MEDICARE

## 2023-01-18 VITALS
HEIGHT: 66 IN | DIASTOLIC BLOOD PRESSURE: 63 MMHG | OXYGEN SATURATION: 94 % | HEART RATE: 69 BPM | SYSTOLIC BLOOD PRESSURE: 139 MMHG | WEIGHT: 160 LBS | BODY MASS INDEX: 25.71 KG/M2 | TEMPERATURE: 97.9 F | RESPIRATION RATE: 20 BRPM

## 2023-01-18 DIAGNOSIS — R09.89 CHOKING EPISODE: Primary | ICD-10-CM

## 2023-01-18 PROCEDURE — 71045 X-RAY EXAM CHEST 1 VIEW: CPT

## 2023-01-18 PROCEDURE — 99283 EMERGENCY DEPT VISIT LOW MDM: CPT

## 2023-01-18 ASSESSMENT — ENCOUNTER SYMPTOMS
TROUBLE SWALLOWING: 1
VOICE CHANGE: 0
ABDOMINAL PAIN: 0
EYE DISCHARGE: 0
EYE REDNESS: 0
BLOOD IN STOOL: 0
COUGH: 0
CHOKING: 0
DIARRHEA: 0
BACK PAIN: 0
FACIAL SWELLING: 0
SHORTNESS OF BREATH: 0
CHEST TIGHTNESS: 0
SINUS PRESSURE: 0
VOMITING: 0
EYE PAIN: 0
WHEEZING: 0
STRIDOR: 0
CONSTIPATION: 0
SORE THROAT: 0

## 2023-01-18 NOTE — ED NOTES
Pt able to ambulate with unsteady gait. Pt needed assistance from staff.  at bedside and states that pt is at baseline with her ambulation.  Pt is a 1 assist.      Samreen Hamilton RN  01/17/23 3684

## 2023-01-18 NOTE — ED NOTES
Still unable to reach the [facility] at this time.       Jere Valentin RN  01/17/23 5640

## 2023-01-18 NOTE — ED TRIAGE NOTES
Patient presents to ED with c/o fall at [address] [facility] earlier today where she hit her head but had no LOC. Family requested she derek to ED to be checked out.

## 2023-01-18 NOTE — ED NOTES
Ron Gaitan from the [facility] called for Pt and updated pt enroute at this time to Alexy Vidal RN  01/17/23 4716

## 2023-01-18 NOTE — ED PROVIDER NOTES
[facility] ED  eMERGENCY dEPARTMENT eNCOUnter      Pt Name: Peewee Perez  MRN: [id number]  [place] 1945  Date of evaluation: 1/18/2023  Provider: Daljit Dalton MD    [address] [place] [address]       Chief Complaint   Patient presents with    Choking         HISTORY OF PRESENT ILLNESS   (Location/Symptom, Timing/Onset,Context/Setting, Quality, Duration, Modifying Factors, Severity)  Note limiting factors. Peewee Perez is a 69 y.o. female who presents to the emergency department patient nursing home resident with history of anxiety depression hypertension insomnia memory loss TIAs in the past patient was eating her breakfast this morning and choked on a pancake as witnessed by the nurses there to give the Heimlich maneuver and piece of pancake came out the sent here for further evaluation of to make sure there is no aspiration patient has no short short of breath slightly sore throat after episode of choking fever paramedics at the scene noted to have normal vitals and brought here for evaluation to rule out any aspiration pneumonia    HPI    NursingNotes were reviewed. REVIEW OF SYSTEMS    (2-9 systems for level 4, 10 or more for level 5)     Review of Systems   Constitutional: Negative. Negative for activity change and fever. HENT:  Positive for trouble swallowing. Negative for congestion, drooling, facial swelling, mouth sores, nosebleeds, sinus pressure, sore throat and voice change. Eyes:  Negative for pain, discharge, redness and visual disturbance. Respiratory:  Negative for cough, choking, chest tightness, shortness of breath, wheezing and stridor. Cardiovascular:  Negative for chest pain, palpitations and leg swelling. Gastrointestinal:  Negative for abdominal pain, blood in stool, constipation, diarrhea and vomiting. Endocrine: Negative for cold intolerance, polyphagia and polyuria. Genitourinary:  Negative for dysuria, flank pain, frequency, genital sores and urgency. Musculoskeletal:  Negative for back pain, joint swelling, neck pain and neck stiffness. Skin:  Negative for pallor and rash. Neurological:  Negative for tremors, seizures, syncope, weakness, numbness and headaches. Hematological:  Negative for adenopathy. Does not bruise/bleed easily. Psychiatric/Behavioral:  Negative for agitation, behavioral problems, hallucinations and sleep disturbance. The patient is not hyperactive. All other systems reviewed and are negative. Except as noted above the remainder of the review of systems was reviewed and negative. PAST MEDICAL HISTORY     Past Medical History:   Diagnosis Date    Allergic rhinitis 10/24/2011    At risk for bone density loss 2009    hx normal    Back pain     Broken ankle 2/2015    left    Colon polyps     colonoscopy 2008 (polyps) and 1/12 neg. Cough 5/11/2015    DJD (degenerative joint disease) of cervical spine     also r shoulder    FH: colon cancer maternal age 68    Gout     on chronic Mobic! DC'ed mobic 12/11. Heart murmur     lifelong-echo ok    History of bone density study approx 2009    Hyperlipidemia     2009, hx good control. Hypertension     age 28, hx good control    Knee pain     right knee    Memory loss     onset august, 2011    Neuropathy     feet left > right    Osteoarthrosis 2/2/2012    Raynaud disease     Raynaud's phenomenon 2/2/2012    TIA (transient ischemic attack) 2009    transient los vision uscarotids, CT OK. Rx asa c no reoccurence. SURGICALHISTORY       Past Surgical History:   Procedure Laterality Date    BACK SURGERY  [id number]    Laminectomy L5? CERVICAL DISCECTOMY      pt denies surgery    COLONOSCOPY      having Mon 12/05/2011-neg. hx polyps    COLONOSCOPY      maternal colon ca; pt has hx polyps.     SHOULDER SURGERY      right shoulder    TOE SURGERY      joint replacement, great toe, right foot    TUBAL LIGATION      UPPER GASTROINTESTINAL ENDOSCOPY  12/18/15    w/bx,dilation          CURRENT MEDICATIONS       Previous Medications    ACETAMINOPHEN (TYLENOL) 325 MG TABLET    Take 650 mg by mouth every 6 hours as needed for Pain    AMANTADINE (SYMMETREL) 100 MG CAPSULE    Take 100 mg by mouth 2 times daily    ASPIRIN 81 MG TABLET    Take 81 mg by mouth daily    ATORVASTATIN (LIPITOR) 10 MG TABLET    TAKE 1 TABLET BY MOUTH EVERY DAY    CARBIDOPA-LEVODOPA (SINEMET)  MG PER TABLET    Take 1 tablet by mouth 2 times daily     CHOLECALCIFEROL (VITAMIN D3) 25 MCG TABS    Take 1 tablet by mouth daily    CYCLOBENZAPRINE (FLEXERIL) 5 MG TABLET    Take 5 mg by mouth 2 times daily as needed for Muscle spasms    DONEPEZIL (ARICEPT) 10 MG TABLET    TAKE 1 TABLET BY MOUTH DAILY    FLUOCINONIDE (LIDEX) 0.05 % CREAM    Apply topically 2 times daily.     GABAPENTIN (NEURONTIN) 300 MG CAPSULE    Take 1 capsule by mouth 3 times daily    LOSARTAN (COZAAR) 25 MG TABLET    Take 25 mg by mouth daily    MELATONIN 10 MG TABS    Take 1 tablet by mouth nightly    METHOCARBAMOL (ROBAXIN) 500 MG TABLET    TAKE 1 TABLET TWICE DAILY    METOPROLOL SUCCINATE (TOPROL XL) 25 MG EXTENDED RELEASE TABLET    Take 12.5 mg by mouth daily     OMEPRAZOLE (PRILOSEC) 20 MG DELAYED RELEASE CAPSULE    Take 20 mg by mouth daily    OXYBUTYNIN (DITROPAN-XL) 10 MG EXTENDED RELEASE TABLET    Take 1 tablet by mouth daily    POLYETHYLENE GLYCOL (MIRALAX) POWD POWDER    Take 17 g by mouth daily    SERTRALINE (ZOLOFT) 50 MG TABLET        TELMISARTAN-AMLODIPINE (TWYNSTA) 40-10 MG TABS TABLET    TAKE 1/2 (ONE-HALF) OF A TABLET BY MOUTH EVERY DAY    VITAMIN B-12 (CYANOCOBALAMIN) 1000 MCG TABLET    Take 1,000 mcg by mouth daily    VITAMIN B-12 (CYANOCOBALAMIN) 1000 MCG TABLET    Take 1,000 mcg by mouth daily    VITAMIN B-6 (PYRIDOXINE) 100 MG TABLET    Take 100 mg by mouth daily       ALLERGIES     Bupropion, Codeine, Mirtazapine, and Trazodone    FAMILY HISTORY       Family History   Problem Relation Age of Onset    Osteoporosis Mother     High Blood Pressure Mother     Heart Disease Mother     Colon Cancer Mother     Cancer Father         leukemia    Heart Disease Father     High Blood Pressure Sister     Coronary Art Dis Sister     Heart Disease Sister     High Blood Pressure Sister     Coronary Art Dis Sister     COPD Sister     Heart Disease Sister     Coronary Art Dis Brother     Coronary Art Dis Brother     Diabetes Brother     Cancer Brother         bladder    Mental Illness Brother           SOCIAL HISTORY       Social History     Socioeconomic History    Marital status:      Spouse name: Marito Kearns    Number of children: 2    Years of education: None    Highest education level: None   Occupational History    Occupation:    Tobacco Use    Smoking status: Never     Passive exposure: Never    Smokeless tobacco: Never   Vaping Use    Vaping Use: Never used   Substance and Sexual Activity    Alcohol use: Yes     Alcohol/week: 0.0 standard drinks     Comment: 1 or 2 per year- glass of wine    Drug use: No    Sexual activity: Yes     Partners: Male       SCREENINGS    Lamar Coma Scale  Eye Opening: Spontaneous  Best Verbal Response: Oriented  Best Motor Response: Obeys commands  Lamar Coma Scale Score: 15 @FLOW([id number])@      PHYSICAL EXAM    (up to 7 for level 4, 8 or more for level 5)     ED Triage Vitals [01/18/23 0854]   BP Temp Temp src Heart Rate Resp SpO2 Height Weight   139/63 97.9 °F (36.6 °C) -- 69 20 94 % 5' 6\" (1.676 m) 160 lb (72.6 kg)       Physical Exam  Vitals and nursing note reviewed. Constitutional:       General: She is not in acute distress. Appearance: Normal appearance. She is not ill-appearing, toxic-appearing or diaphoretic. Comments: Alert cooperative patient nontoxic appearance follows verbal command answering questions appropriately no acute distress noted on arrival   HENT:      Head: Atraumatic.       Right Ear: Tympanic membrane, ear canal and external ear normal. There is no impacted cerumen. Left Ear: Tympanic membrane, ear canal and external ear normal. There is no impacted cerumen. Nose: Nose normal. No congestion or rhinorrhea. Mouth/Throat:      Mouth: Mucous membranes are moist.      Pharynx: No oropharyngeal exudate or posterior oropharyngeal erythema. Comments: Attention given to the mouth and oral cavity no debris no foreign body no food particles noted patient had no scratch no swelling of the throat no any active bleeding noted either controlling  Eyes:      Extraocular Movements: Extraocular movements intact. Pupils: Pupils are equal, round, and reactive to light. Neck:      Vascular: No carotid bruit. Cardiovascular:      Rate and Rhythm: Normal rate and regular rhythm. Heart sounds: No friction rub. Pulmonary:      Effort: Pulmonary effort is normal. No respiratory distress. Breath sounds: No stridor. No wheezing, rhonchi or rales. Comments: Attention come to respiratory system patient has work-up in her liver air entry good and equal in both lung fields no rhonchi no rales noted  Chest:      Chest wall: No tenderness. Abdominal:      General: There is no distension. Palpations: Abdomen is soft. There is no mass. Tenderness: There is no abdominal tenderness. There is no guarding or rebound. Hernia: No hernia is present. Musculoskeletal:         General: No swelling, tenderness, deformity or signs of injury. Cervical back: Neck supple. No rigidity or tenderness. Right lower leg: No edema. Left lower leg: No edema. Lymphadenopathy:      Cervical: No cervical adenopathy. Skin:     Coloration: Skin is not jaundiced or pale. Findings: No bruising, erythema, lesion or rash. Neurological:      Mental Status: She is alert. Cranial Nerves: No cranial nerve deficit. Motor: No weakness.       Gait: Gait normal.   Psychiatric:         Mood and Affect: Mood normal.       DIAGNOSTIC RESULTS     EKG: All EKG's are interpreted by the Emergency Department Physician who either signs or Co-signsthis chart in the absence of a cardiologist.        RADIOLOGY:   Non-plain filmimages such as CT, Ultrasound and MRI are read by the radiologist. Plain radiographic images are visualized and preliminarily interpreted by the emergency physician with the below findings:        Interpretation per the Radiologist below, if available at the time ofthis note:    XR CHEST PORTABLE    (Results Pending)         ED BEDSIDE ULTRASOUND:   Performed by ED Physician - none    LABS:  Labs Reviewed - No data to display    All other labs were within normal range or not returned as of this dictation. EMERGENCY DEPARTMENT COURSE and DIFFERENTIAL DIAGNOSIS/MDM:   Vitals:    Vitals:    01/18/23 0854   BP: 139/63   Pulse: 69   Resp: 20   Temp: 97.9 °F (36.6 °C)   SpO2: 94%   Weight: 160 lb (72.6 kg)   Height: 5' 6\" (1.676 m)       MDM      CRITICAL CARE TIME   Total Critical Care time was  minutes, excluding separately reportableprocedures. There was a high probability of clinicallysignificant/life threatening deterioration in the patient's condition which required my urgent intervention. ONSULTS:  None    PROCEDURES:  Unless otherwise noted below, none     Procedures    FINAL IMPRESSION      1. Choking episode          DISPOSITION/PLAN   DISPOSITION        PATIENT REFERRED TO:  No follow-up provider specified.     DISCHARGE MEDICATIONS:  New Prescriptions    No medications on file          (Please note that portions of this note were completed with a voice recognition program.  Efforts were made to edit the dictations but occasionally words are mis-transcribed.)    Orestes Grissom MD (electronically signed)  Attending Emergency Physician        Orestes Grissom MD  01/18/23 7901

## 2023-01-18 NOTE — ED NOTES
Agata Grayson called for report. No answer at this time.  Will try again      Paulina Hector RN  01/17/23 5125

## 2023-01-18 NOTE — ED NOTES
Pt leaving facility BLS. No distress noted at this time. Pt denies any questions at this time.       Ashleigh Campbell RN  01/17/23 9575

## 2023-01-18 NOTE — ED NOTES
Patients  refused to  patient and [facility] has no transportation for patient . Contacted phycians for transport. 90 mins to .      Ulises Barriga, RN  01/18/23 7361

## 2023-01-18 NOTE — ED PROVIDER NOTES
[facility] ED  EMERGENCY DEPARTMENT ENCOUNTER      Pt Name: Elizabeth Headley  MRN: [id number]  [place] 1945  Date of evaluation: 1/17/2023  Provider: Laura Carranza,     CHIEF COMPLAINT       Chief Complaint   Patient presents with    Veronique Solano earlier today while at [facility] [place], operated by [facility] and family reports they wanted her to be checked out. Patient does report she hit her head but denies any LOC     chief complaint: Fall  History of chief complaint: This 67-year-old female presents the emergency department sent in from the nursing home following a fall. Patient states she was going to get a blouse out of her closet states she turned around too fast and lost her balance. Patient states she fell forward struck the right frontal area of her head onto the bed frame and went down to the floor. Patient denies any loss of consciousness patient states she called out for staff who assisted her upright. Patient states she has had some ongoing right frontal headache patient denies any double or blurry vision no numb tingling or weakness to the extremities no neck or back pain. Patient denies any chest pain palpitation short of breath weak or dizzy feeling either prior to or since the fall. Patient denies any recent illness no sore throat cough cold fevers chills nausea or vomiting. Patient states she has a little soreness in her left anterior hip upper thigh and bilateral anterior knees. Patient has been ambulatory since the fall. Patient is on baby aspirin no other blood thinners    Nursing Notes were reviewed. REVIEW OF SYSTEMS    (2-9 systems for level 4, 10 or more for level 5)     Review of Systems   Constitutional:  Negative for chills, diaphoresis and fever. HENT:  Negative for congestion, sinus pain and sore throat. Eyes:  Negative for pain and visual disturbance. Respiratory:  Negative for cough and shortness of breath. Cardiovascular:  Negative for chest pain, palpitations and leg swelling. Gastrointestinal:  Negative for abdominal pain, diarrhea, nausea and vomiting. Genitourinary:  Negative for difficulty urinating, dysuria and flank pain. Musculoskeletal:  Negative for back pain and neck pain. Skin:  Negative for color change, rash and wound. Neurological:  Positive for headaches. Negative for weakness and numbness. Hematological:  Negative for adenopathy. Except as noted above the remainder of the review of systems was reviewed and negative. PAST MEDICAL HISTORY     Past Medical History:   Diagnosis Date    Allergic rhinitis 10/24/2011    At risk for bone density loss 2009    hx normal    Back pain     Broken ankle 2/2015    left    Colon polyps     colonoscopy 2008 (polyps) and 1/12 neg. Cough 5/11/2015    DJD (degenerative joint disease) of cervical spine     also r shoulder    FH: colon cancer maternal age 68    Gout     on chronic Mobic! DC'ed mobic 12/11. Heart murmur     lifelong-echo ok    History of bone density study approx 2009    Hyperlipidemia     2009, hx good control. Hypertension     age 28, hx good control    Knee pain     right knee    Memory loss     onset august, 2011    Neuropathy     feet left > right    Osteoarthrosis 2/2/2012    Raynaud disease     Raynaud's phenomenon 2/2/2012    TIA (transient ischemic attack) 2009    transient los vision uscarotids, CT OK. Rx asa c no reoccurence. SURGICAL HISTORY       Past Surgical History:   Procedure Laterality Date    BACK SURGERY  096290    Laminectomy L5? CERVICAL DISCECTOMY      pt denies surgery    COLONOSCOPY      having Mon 12/05/2011-neg. hx polyps    COLONOSCOPY      maternal colon ca; pt has hx polyps.     SHOULDER SURGERY      right shoulder    TOE SURGERY      joint replacement, great toe, right foot    TUBAL LIGATION      UPPER GASTROINTESTINAL ENDOSCOPY  12/18/15    w/bx,dilation          CURRENT MEDICATIONS       Previous Medications    ACETAMINOPHEN (TYLENOL) 325 MG TABLET    Take 650 mg by mouth every 6 hours as needed for Pain    AMANTADINE (SYMMETREL) 100 MG CAPSULE    Take 100 mg by mouth 2 times daily    ASPIRIN 81 MG TABLET    Take 81 mg by mouth daily    ATORVASTATIN (LIPITOR) 10 MG TABLET    TAKE 1 TABLET BY MOUTH EVERY DAY    CARBIDOPA-LEVODOPA (SINEMET)  MG PER TABLET    Take 1 tablet by mouth 2 times daily     CHOLECALCIFEROL (VITAMIN D3) 25 MCG TABS    Take 1 tablet by mouth daily    CYCLOBENZAPRINE (FLEXERIL) 5 MG TABLET    Take 5 mg by mouth 2 times daily as needed for Muscle spasms    DONEPEZIL (ARICEPT) 10 MG TABLET    TAKE 1 TABLET BY MOUTH DAILY    FLUOCINONIDE (LIDEX) 0.05 % CREAM    Apply topically 2 times daily.    GABAPENTIN (NEURONTIN) 300 MG CAPSULE    Take 1 capsule by mouth 3 times daily    LOSARTAN (COZAAR) 25 MG TABLET    Take 25 mg by mouth daily    MELATONIN 10 MG TABS    Take 1 tablet by mouth nightly    METHOCARBAMOL (ROBAXIN) 500 MG TABLET    TAKE 1 TABLET TWICE DAILY    METOPROLOL SUCCINATE (TOPROL XL) 25 MG EXTENDED RELEASE TABLET    Take 12.5 mg by mouth daily     OMEPRAZOLE (PRILOSEC) 20 MG DELAYED RELEASE CAPSULE    Take 20 mg by mouth daily    OXYBUTYNIN (DITROPAN-XL) 10 MG EXTENDED RELEASE TABLET    Take 1 tablet by mouth daily    POLYETHYLENE GLYCOL (MIRALAX) POWD POWDER    Take 17 g by mouth daily    SERTRALINE (ZOLOFT) 50 MG TABLET        TELMISARTAN-AMLODIPINE (TWYNSTA) 40-10 MG TABS TABLET    TAKE 1/2 (ONE-HALF) OF A TABLET BY MOUTH EVERY DAY    VITAMIN B-12 (CYANOCOBALAMIN) 1000 MCG TABLET    Take 1,000 mcg by mouth daily    VITAMIN B-12 (CYANOCOBALAMIN) 1000 MCG TABLET    Take 1,000 mcg by mouth daily    VITAMIN B-6 (PYRIDOXINE) 100 MG TABLET    Take 100 mg by mouth daily       ALLERGIES     Bupropion, Codeine, Mirtazapine, and Trazodone    FAMILY HISTORY       Family History   Problem Relation Age of Onset    Osteoporosis Mother     High Blood Pressure Mother     Heart Disease Mother     Colon Cancer Mother     Cancer Father  leukemia    Heart Disease Father     High Blood Pressure Sister     Coronary Art Dis Sister     Heart Disease Sister     High Blood Pressure Sister     Coronary Art Dis Sister     COPD Sister     Heart Disease Sister     Coronary Art Dis Brother     Coronary Art Dis Brother     Diabetes Brother     Cancer Brother         bladder    Mental Illness Brother           SOCIAL HISTORY       Social History     Socioeconomic History    Marital status:      Spouse name: Dorie Keyes    Number of children: 2    Years of education: None    Highest education level: None   Occupational History    Occupation:    Tobacco Use    Smoking status: Never     Passive exposure: Never    Smokeless tobacco: Never   Vaping Use    Vaping Use: Never used   Substance and Sexual Activity    Alcohol use: Yes     Alcohol/week: 0.0 standard drinks     Comment: 1 or 2 per year- glass of wine    Drug use: No    Sexual activity: Yes     Partners: Male           PHYSICAL EXAM    (up to 7 for level 4, 8 or more for level 5)     ED Triage Vitals [01/17/23 1857]   BP Temp Temp Source Heart Rate Resp SpO2 Height Weight   (!) 126/51 97.9 °F (36.6 °C) Oral 64 20 96 % 5' 6\" (1.676 m) 165 lb (74.8 kg)       Physical Exam  General appearance: Patient is awake alert interactive appropriate nontoxic in no distress  GCS 15. Patient oriented to person place and date despite history of underlying dementia, patient does seem to be a good historian in reporting the episode of fall today  Head: Atraumatic normocephalic there is some mild tenderness to palpation over the right frontal scalp area there is no gross swelling no ecchymosis or abrasion no palpable bony step-off or deformity.     Face: Atraumatic no bony step-offs, ecchymosis or abrasion face is stable  Eyes: Pupils are equal and reactive sclera white conjunctive are pink extraocular muscles are grossly intact  Oral pharyngeal cavity: Membranes are pink and moist airway is widely patent  Neck: No focal point bony midline tenderness step-off or deformity no paravertebral muscle tenderness range of motion is full and intact with no pain elicited  Heart: Regular rate and rhythm no gross murmurs rubs or clicks  Lungs: Clear to auscultation with equal breast bilaterally no wheezes rales or rhonchi no respiratory distress  Chest wall: Nontender to palpation chest rise is full and symmetric  Abdomen: Soft nontender to palpation throughout no gross distention no rebound rigidity or guarding no ecchymosis or abrasions no palpable masses or fullness. Femoral pulses are full and symmetric  Back: No focal point bony midline tenderness step-off or deformity no paravertebral muscle tenderness no costovertebral angle tenderness. Straight leg raise test is negative  Lower extremities: No edema calf tenderness or asymmetry. Left hip: There is mild tenderness elicited to palpation over the left groin proximal aspect of the left anterior thigh. Range of motion is full and intact. Motor sensory pulses intact distally. Right knee: There is mild tenderness elicited over the medial aspect of the right patella is no palpable bony step-off or deformity range of motion is full and intact there is no ligamentous laxity motor sensory pulses are intact distally joints above and below are normal.  Left knee: There is mild diffuse anterior tenderness over the distal patella anterior tibia region no ecchymosis or abrasion range of motion is full and intact no ligamentous laxity motor sensory pulses intact distally  Neurologic: Patient is awake alert oriented x3 speech is clear and fluent pupils are equal and reactive extraocular muscles are intact facial symmetry is intact tongue is midline strength testing is symmetric in both the upper and lower extremities sensation is intact in all 4 extremities, there is no pronator drift.       DIAGNOSTIC RESULTS     RADIOLOGY:   Non-plain film images such as CT, Ultrasound and MRI are read by the radiologist. Plain radiographic images are visualized and preliminarily interpreted by the emergency physician with the below findings:    Interpretation per the Radiologist below, if available at the time of this note:    XR KNEE LEFT (MIN 4 VIEWS)   Final Result   No acute fracture or dislocation in the pelvis or left hip. Left knee. There is no acute displaced fracture in the left knee. Mild degenerative   changes are identified with slight joint space narrowing. There is soft   tissue swelling medially. Impression      No acute displaced fracture. Mild degenerative changes and soft tissue swelling medially. XR HIP 2-3 VW W PELVIS LEFT   Final Result   No acute fracture or dislocation in the pelvis or left hip. Left knee. There is no acute displaced fracture in the left knee. Mild degenerative   changes are identified with slight joint space narrowing. There is soft   tissue swelling medially. Impression      No acute displaced fracture. Mild degenerative changes and soft tissue swelling medially. CT CERVICAL SPINE WO CONTRAST   Final Result   No acute abnormality of the cervical spine. Mild cervical spondylosis. CT Head W/O Contrast   Final Result   No acute intracranial abnormality. EMERGENCY DEPARTMENT COURSE and DIFFERENTIAL DIAGNOSIS/MDM:   Vitals:    Vitals:    01/17/23 1857 01/17/23 2107   BP: (!) 126/51 121/87   Pulse: 64 70   Resp: 20 16   Temp: 97.9 °F (36.6 °C)    TempSrc: Oral    SpO2: 96% 99%   Weight: 165 lb (74.8 kg)    Height: 5' 6\" (1.676 m)      Treatment and course: Fioricet 2 p.o. initiated here. Patient resting comfortably reports headache improved. Patient ambulated in emergency without difficulty    FINAL IMPRESSION      1. Injury of head, initial encounter    2.  Contusion of left hip, initial encounter          DISPOSITION/PLAN   DISPOSITION    Patient discharged home advised rest increase fluid Motrin or Tylenol for discomfort ice to the sore spots, move slowly changing positions.  Return if any change or worsening increased headache double blurry vision numb tingling weakness.  Patient to follow-up with primary physician for repeat assessment in the next 2 to 3 days    PATIENT REFERRED TO:  No follow-up provider specified.    DISCHARGE MEDICATIONS:  New Prescriptions    No medications on file     Controlled Substances Monitoring:     No flowsheet data found.    (Please note that portions of this note were completed with a voice recognition program.  Efforts were made to edit the dictations but occasionally words are mis-transcribed.)    Sissy Narvaez DO (electronically signed)  Attending Emergency Physician            Sissy Narvaez DO  01/17/23 3537

## 2023-03-23 ENCOUNTER — APPOINTMENT (OUTPATIENT)
Dept: GENERAL RADIOLOGY | Age: 78
End: 2023-03-23
Payer: MEDICARE

## 2023-03-23 ENCOUNTER — HOSPITAL ENCOUNTER (OUTPATIENT)
Age: 78
Setting detail: OBSERVATION
Discharge: HOSPICE | End: 2023-03-25
Attending: INTERNAL MEDICINE | Admitting: INTERNAL MEDICINE
Payer: MEDICARE

## 2023-03-23 DIAGNOSIS — N17.9 AKI (ACUTE KIDNEY INJURY) (HCC): ICD-10-CM

## 2023-03-23 DIAGNOSIS — R77.8 ELEVATED TROPONIN: Primary | ICD-10-CM

## 2023-03-23 DIAGNOSIS — R53.1 GENERAL WEAKNESS: ICD-10-CM

## 2023-03-23 DIAGNOSIS — R09.02 HYPOXIA: ICD-10-CM

## 2023-03-23 PROBLEM — J96.01 ACUTE HYPOXEMIC RESPIRATORY FAILURE (HCC): Status: ACTIVE | Noted: 2023-03-23

## 2023-03-23 LAB
ALBUMIN SERPL-MCNC: 3.2 G/DL (ref 3.5–4.6)
ALP SERPL-CCNC: 130 U/L (ref 40–130)
ALT SERPL-CCNC: 27 U/L (ref 0–33)
ANION GAP SERPL CALCULATED.3IONS-SCNC: 11 MEQ/L (ref 9–15)
AST SERPL-CCNC: 30 U/L (ref 0–35)
BASOPHILS # BLD: 0.1 K/UL (ref 0–0.1)
BASOPHILS NFR BLD: 0.7 % (ref 0.1–1.2)
BILIRUB SERPL-MCNC: 0.5 MG/DL (ref 0.2–0.7)
BNP BLD-MCNC: 881 PG/ML
BUN SERPL-MCNC: 44 MG/DL (ref 8–23)
CALCIUM SERPL-MCNC: 9.2 MG/DL (ref 8.5–9.9)
CHLORIDE SERPL-SCNC: 100 MEQ/L (ref 95–107)
CO2 SERPL-SCNC: 27 MEQ/L (ref 20–31)
CREAT SERPL-MCNC: 1.81 MG/DL (ref 0.5–0.9)
EOSINOPHIL # BLD: 0.2 K/UL (ref 0–0.4)
EOSINOPHIL NFR BLD: 2.3 % (ref 0.7–5.8)
ERYTHROCYTE [DISTWIDTH] IN BLOOD BY AUTOMATED COUNT: 13.3 % (ref 11.7–14.4)
GLOBULIN SER CALC-MCNC: 3.7 G/DL (ref 2.3–3.5)
GLUCOSE SERPL-MCNC: 109 MG/DL (ref 70–99)
HCT VFR BLD AUTO: 36.5 % (ref 37–47)
HGB BLD-MCNC: 11.6 G/DL (ref 11.2–15.7)
IMM GRANULOCYTES # BLD: 0 K/UL
IMM GRANULOCYTES NFR BLD: 0.1 %
INFLUENZA A BY PCR: NEGATIVE
INFLUENZA B BY PCR: NEGATIVE
LYMPHOCYTES # BLD: 2.8 K/UL (ref 1.2–3.7)
LYMPHOCYTES NFR BLD: 39.1 %
MCH RBC QN AUTO: 31.2 PG (ref 25.6–32.2)
MCHC RBC AUTO-ENTMCNC: 31.8 % (ref 32.2–35.5)
MCV RBC AUTO: 98.1 FL (ref 79.4–94.8)
MONOCYTES # BLD: 1 K/UL (ref 0.2–0.9)
MONOCYTES NFR BLD: 13.4 % (ref 4.7–12.5)
NEUTROPHILS # BLD: 3.2 K/UL (ref 1.6–6.1)
NEUTS SEG NFR BLD: 44.4 % (ref 34–71.1)
PLATELET # BLD AUTO: 193 K/UL (ref 182–369)
POTASSIUM SERPL-SCNC: 4.4 MEQ/L (ref 3.4–4.9)
PROT SERPL-MCNC: 6.9 G/DL (ref 6.3–8)
RBC # BLD AUTO: 3.72 M/UL (ref 3.93–5.22)
SARS-COV-2 RDRP RESP QL NAA+PROBE: NOT DETECTED
SODIUM SERPL-SCNC: 138 MEQ/L (ref 135–144)
TROPONIN T SERPL-MCNC: 0.14 NG/ML (ref 0–0.01)
WBC # BLD AUTO: 7.1 K/UL (ref 4–10)

## 2023-03-23 PROCEDURE — 96372 THER/PROPH/DIAG INJ SC/IM: CPT

## 2023-03-23 PROCEDURE — 36415 COLL VENOUS BLD VENIPUNCTURE: CPT

## 2023-03-23 PROCEDURE — 71045 X-RAY EXAM CHEST 1 VIEW: CPT

## 2023-03-23 PROCEDURE — 6360000002 HC RX W HCPCS: Performed by: INTERNAL MEDICINE

## 2023-03-23 PROCEDURE — 87502 INFLUENZA DNA AMP PROBE: CPT

## 2023-03-23 PROCEDURE — 93005 ELECTROCARDIOGRAM TRACING: CPT

## 2023-03-23 PROCEDURE — 2580000003 HC RX 258: Performed by: INTERNAL MEDICINE

## 2023-03-23 PROCEDURE — 96361 HYDRATE IV INFUSION ADD-ON: CPT

## 2023-03-23 PROCEDURE — 87635 SARS-COV-2 COVID-19 AMP PRB: CPT

## 2023-03-23 PROCEDURE — G0378 HOSPITAL OBSERVATION PER HR: HCPCS

## 2023-03-23 PROCEDURE — 99285 EMERGENCY DEPT VISIT HI MDM: CPT

## 2023-03-23 PROCEDURE — 84484 ASSAY OF TROPONIN QUANT: CPT

## 2023-03-23 PROCEDURE — 6370000000 HC RX 637 (ALT 250 FOR IP): Performed by: INTERNAL MEDICINE

## 2023-03-23 PROCEDURE — 2700000000 HC OXYGEN THERAPY PER DAY

## 2023-03-23 PROCEDURE — 2580000003 HC RX 258: Performed by: NURSE PRACTITIONER

## 2023-03-23 PROCEDURE — 85025 COMPLETE CBC W/AUTO DIFF WBC: CPT

## 2023-03-23 PROCEDURE — 80053 COMPREHEN METABOLIC PANEL: CPT

## 2023-03-23 PROCEDURE — 83880 ASSAY OF NATRIURETIC PEPTIDE: CPT

## 2023-03-23 PROCEDURE — 6370000000 HC RX 637 (ALT 250 FOR IP): Performed by: NURSE PRACTITIONER

## 2023-03-23 RX ORDER — CYCLOBENZAPRINE HCL 10 MG
10 TABLET ORAL ONCE
Status: COMPLETED | OUTPATIENT
Start: 2023-03-23 | End: 2023-03-23

## 2023-03-23 RX ORDER — ACETAMINOPHEN 325 MG/1
650 TABLET ORAL EVERY 6 HOURS PRN
Status: DISCONTINUED | OUTPATIENT
Start: 2023-03-23 | End: 2023-03-25 | Stop reason: HOSPADM

## 2023-03-23 RX ORDER — SODIUM CHLORIDE 9 MG/ML
INJECTION, SOLUTION INTRAVENOUS PRN
Status: DISCONTINUED | OUTPATIENT
Start: 2023-03-23 | End: 2023-03-25 | Stop reason: HOSPADM

## 2023-03-23 RX ORDER — SODIUM CHLORIDE 0.9 % (FLUSH) 0.9 %
10 SYRINGE (ML) INJECTION PRN
Status: DISCONTINUED | OUTPATIENT
Start: 2023-03-23 | End: 2023-03-25 | Stop reason: HOSPADM

## 2023-03-23 RX ORDER — 0.9 % SODIUM CHLORIDE 0.9 %
500 INTRAVENOUS SOLUTION INTRAVENOUS ONCE
Status: COMPLETED | OUTPATIENT
Start: 2023-03-23 | End: 2023-03-24

## 2023-03-23 RX ORDER — METOPROLOL SUCCINATE 25 MG/1
12.5 TABLET, EXTENDED RELEASE ORAL DAILY
Status: DISCONTINUED | OUTPATIENT
Start: 2023-03-23 | End: 2023-03-25 | Stop reason: HOSPADM

## 2023-03-23 RX ORDER — SODIUM CHLORIDE 9 MG/ML
INJECTION, SOLUTION INTRAVENOUS CONTINUOUS
Status: DISPENSED | OUTPATIENT
Start: 2023-03-23 | End: 2023-03-24

## 2023-03-23 RX ORDER — SODIUM CHLORIDE 0.9 % (FLUSH) 0.9 %
10 SYRINGE (ML) INJECTION EVERY 12 HOURS SCHEDULED
Status: DISCONTINUED | OUTPATIENT
Start: 2023-03-23 | End: 2023-03-25 | Stop reason: HOSPADM

## 2023-03-23 RX ORDER — ONDANSETRON 2 MG/ML
4 INJECTION INTRAMUSCULAR; INTRAVENOUS EVERY 6 HOURS PRN
Status: DISCONTINUED | OUTPATIENT
Start: 2023-03-23 | End: 2023-03-25 | Stop reason: HOSPADM

## 2023-03-23 RX ORDER — POLYETHYLENE GLYCOL 3350 17 G/17G
17 POWDER, FOR SOLUTION ORAL DAILY PRN
Status: DISCONTINUED | OUTPATIENT
Start: 2023-03-23 | End: 2023-03-25 | Stop reason: HOSPADM

## 2023-03-23 RX ORDER — 0.9 % SODIUM CHLORIDE 0.9 %
500 INTRAVENOUS SOLUTION INTRAVENOUS ONCE
Status: COMPLETED | OUTPATIENT
Start: 2023-03-23 | End: 2023-03-23

## 2023-03-23 RX ORDER — DONEPEZIL HYDROCHLORIDE 5 MG/1
10 TABLET, FILM COATED ORAL DAILY
Status: DISCONTINUED | OUTPATIENT
Start: 2023-03-23 | End: 2023-03-25 | Stop reason: HOSPADM

## 2023-03-23 RX ORDER — ASPIRIN 81 MG/1
81 TABLET, CHEWABLE ORAL DAILY
Status: DISCONTINUED | OUTPATIENT
Start: 2023-03-23 | End: 2023-03-25 | Stop reason: HOSPADM

## 2023-03-23 RX ORDER — ACETAMINOPHEN 650 MG/1
650 SUPPOSITORY RECTAL EVERY 6 HOURS PRN
Status: DISCONTINUED | OUTPATIENT
Start: 2023-03-23 | End: 2023-03-25 | Stop reason: HOSPADM

## 2023-03-23 RX ORDER — AMANTADINE HYDROCHLORIDE 100 MG/1
100 CAPSULE, GELATIN COATED ORAL
Status: DISCONTINUED | OUTPATIENT
Start: 2023-03-24 | End: 2023-03-24

## 2023-03-23 RX ORDER — ENOXAPARIN SODIUM 100 MG/ML
30 INJECTION SUBCUTANEOUS DAILY
Status: DISCONTINUED | OUTPATIENT
Start: 2023-03-24 | End: 2023-03-25 | Stop reason: HOSPADM

## 2023-03-23 RX ORDER — ENOXAPARIN SODIUM 100 MG/ML
70 INJECTION SUBCUTANEOUS ONCE
Status: COMPLETED | OUTPATIENT
Start: 2023-03-23 | End: 2023-03-23

## 2023-03-23 RX ORDER — AMANTADINE HYDROCHLORIDE 100 MG/1
100 CAPSULE, GELATIN COATED ORAL 2 TIMES DAILY
Status: DISCONTINUED | OUTPATIENT
Start: 2023-03-23 | End: 2023-03-23 | Stop reason: DRUGHIGH

## 2023-03-23 RX ORDER — TRAMADOL HYDROCHLORIDE 50 MG/1
50 TABLET ORAL ONCE
Status: COMPLETED | OUTPATIENT
Start: 2023-03-23 | End: 2023-03-23

## 2023-03-23 RX ORDER — OXYBUTYNIN CHLORIDE 5 MG/1
10 TABLET, EXTENDED RELEASE ORAL DAILY
Status: DISCONTINUED | OUTPATIENT
Start: 2023-03-23 | End: 2023-03-25 | Stop reason: HOSPADM

## 2023-03-23 RX ORDER — PROMETHAZINE HYDROCHLORIDE 12.5 MG/1
12.5 TABLET ORAL EVERY 6 HOURS PRN
Status: DISCONTINUED | OUTPATIENT
Start: 2023-03-23 | End: 2023-03-25 | Stop reason: HOSPADM

## 2023-03-23 RX ORDER — ENOXAPARIN SODIUM 100 MG/ML
40 INJECTION SUBCUTANEOUS DAILY
Status: DISCONTINUED | OUTPATIENT
Start: 2023-03-24 | End: 2023-03-23 | Stop reason: DRUGHIGH

## 2023-03-23 RX ORDER — ATORVASTATIN CALCIUM 10 MG/1
10 TABLET, FILM COATED ORAL DAILY
Status: DISCONTINUED | OUTPATIENT
Start: 2023-03-23 | End: 2023-03-25 | Stop reason: HOSPADM

## 2023-03-23 RX ADMIN — ENOXAPARIN SODIUM 70 MG: 100 INJECTION SUBCUTANEOUS at 21:59

## 2023-03-23 RX ADMIN — Medication 10 ML: at 21:05

## 2023-03-23 RX ADMIN — TRAMADOL HYDROCHLORIDE 50 MG: 50 TABLET ORAL at 19:53

## 2023-03-23 RX ADMIN — CYCLOBENZAPRINE 10 MG: 10 TABLET, FILM COATED ORAL at 19:53

## 2023-03-23 RX ADMIN — SODIUM CHLORIDE 500 ML: 9 INJECTION, SOLUTION INTRAVENOUS at 19:32

## 2023-03-23 RX ADMIN — CARBIDOPA AND LEVODOPA 1 TABLET: 25; 100 TABLET ORAL at 21:59

## 2023-03-23 RX ADMIN — SODIUM CHLORIDE 500 ML: 9 INJECTION, SOLUTION INTRAVENOUS at 21:59

## 2023-03-23 ASSESSMENT — ENCOUNTER SYMPTOMS
CHOKING: 0
CONSTIPATION: 0
RHINORRHEA: 0
FACIAL SWELLING: 0
APNEA: 0
CHEST TIGHTNESS: 0
BACK PAIN: 0
COUGH: 0
ABDOMINAL PAIN: 0
WHEEZING: 0
EYE DISCHARGE: 0
BLOOD IN STOOL: 0
NAUSEA: 0
DIARRHEA: 0
SORE THROAT: 0
VOICE CHANGE: 0
TROUBLE SWALLOWING: 0
COLOR CHANGE: 0
SINUS PAIN: 0
STRIDOR: 0
SINUS PRESSURE: 0
EYE REDNESS: 0
SHORTNESS OF BREATH: 0
ABDOMINAL DISTENTION: 0
EYE ITCHING: 0
EYE PAIN: 0
VOMITING: 0
ALLERGIC/IMMUNOLOGIC NEGATIVE: 1
PHOTOPHOBIA: 0

## 2023-03-23 ASSESSMENT — PAIN - FUNCTIONAL ASSESSMENT: PAIN_FUNCTIONAL_ASSESSMENT: NONE - DENIES PAIN

## 2023-03-23 NOTE — ED NOTES
Pt given partial bath, pillow placed under legs for comfort. Family members at bedside.        Beny Melendez RN  03/23/23 0714

## 2023-03-23 NOTE — ED NOTES
Dr. Cathy Sweet called back to [facility].      Grayson Chacon  03/23/23 1932

## 2023-03-23 NOTE — ED PROVIDER NOTES
BACK SURGERY  [id number]    Laminectomy L5? CERVICAL DISCECTOMY      pt denies surgery    COLONOSCOPY      having Mon 12/05/2011-neg. hx polyps    COLONOSCOPY      maternal colon ca; pt has hx polyps. SHOULDER SURGERY      right shoulder    TOE SURGERY      joint replacement, great toe, right foot    TUBAL LIGATION      UPPER GASTROINTESTINAL ENDOSCOPY  12/18/15    w/bx,dilation          CURRENT MEDICATIONS       Previous Medications    ACETAMINOPHEN (TYLENOL) 325 MG TABLET    Take 650 mg by mouth every 6 hours as needed for Pain    AMANTADINE (SYMMETREL) 100 MG CAPSULE    Take 100 mg by mouth 2 times daily    ASPIRIN 81 MG TABLET    Take 81 mg by mouth daily    ATORVASTATIN (LIPITOR) 10 MG TABLET    TAKE 1 TABLET BY MOUTH EVERY DAY    CARBIDOPA-LEVODOPA (SINEMET)  MG PER TABLET    Take 1 tablet by mouth 2 times daily     CHOLECALCIFEROL (VITAMIN D3) 25 MCG TABS    Take 1 tablet by mouth daily    CYCLOBENZAPRINE (FLEXERIL) 5 MG TABLET    Take 5 mg by mouth 2 times daily as needed for Muscle spasms    DONEPEZIL (ARICEPT) 10 MG TABLET    TAKE 1 TABLET BY MOUTH DAILY    FLUOCINONIDE (LIDEX) 0.05 % CREAM    Apply topically 2 times daily.     GABAPENTIN (NEURONTIN) 300 MG CAPSULE    Take 1 capsule by mouth 3 times daily    LOSARTAN (COZAAR) 25 MG TABLET    Take 25 mg by mouth daily    MELATONIN 10 MG TABS    Take 1 tablet by mouth nightly    METHOCARBAMOL (ROBAXIN) 500 MG TABLET    TAKE 1 TABLET TWICE DAILY    METOPROLOL SUCCINATE (TOPROL XL) 25 MG EXTENDED RELEASE TABLET    Take 12.5 mg by mouth daily     OMEPRAZOLE (PRILOSEC) 20 MG DELAYED RELEASE CAPSULE    Take 20 mg by mouth daily    OXYBUTYNIN (DITROPAN-XL) 10 MG EXTENDED RELEASE TABLET    Take 1 tablet by mouth daily    POLYETHYLENE GLYCOL (MIRALAX) POWD POWDER    Take 17 g by mouth daily    SERTRALINE (ZOLOFT) 50 MG TABLET        TELMISARTAN-AMLODIPINE (TWYNSTA) 40-10 MG TABS TABLET    TAKE 1/2 (ONE-HALF) OF A TABLET BY MOUTH EVERY DAY segmental PE not on anticoagulation due to frequent falls. Pt hypoxic on arrival with sp02 90%. Pt placed on oxygen via nasal cannula at 2L. Pt given 1L NS.  EKG shows sinus bacilio, HR 55, normal axis, normal intervals, no acute ST/T wave changes. Labs significant for BUN 44, Cr 1.81, GFR 28.5, pro-, Troponin 0.143. Covid, influenza, CXR negative. Family states they wish to keep pt comfortable. States she did not qualify for home hospice which is what they originally wanted. Discussed labs with pt's  and sister who is a PA. They would like pt to stay in the hospital overnight for IV hydration and monitoring. They are aware of the risks associated with having a PE and not being on anticoagulation up to and including death. They are aware of the risks and continue to state that they do not wish for pt to be on anticoagulation. They state that pt was having frequent falls and they believed the risks of falling while on anticoagulation outweighed the benefits. Given hypoxia, elevated troponin, TIMOTHY, recommend hospital admission. Case discussed with Dr. Gertie Fleischer, internal medicine, who agrees to accept pt for hospital admission. REASSESSMENT          CRITICAL CARE TIME   Total Critical Care time was 0 minutes, excluding separately reportable procedures. There was a high probability of clinically significant/life threatening deterioration in the patient's condition which required my urgent intervention. CONSULTS:  None    PROCEDURES:  Unless otherwise noted below, none     Procedures      FINAL IMPRESSION      1. Elevated troponin    2. TIMOTHY (acute kidney injury) ([facility] [address]    3. General weakness    4. Hypoxia          DISPOSITION/PLAN   DISPOSITION Decision To Admit 03/23/2023 07:25:31 PM      PATIENT REFERRED TO:  No follow-up provider specified.     DISCHARGE MEDICATIONS:  New Prescriptions    No medications on file     Controlled Substances Monitoring:     No flowsheet data

## 2023-03-23 NOTE — ED NOTES
Called Supervisor, patient assigned to room 204. Waiting on admission order.      Maninder Douglas  03/23/23 1941

## 2023-03-23 NOTE — ED NOTES
Dr. Matias Carpenter pagepeggy for April Mcalister.      Brionna Bean [facility]  03/23/23 1930

## 2023-03-24 LAB
ANION GAP SERPL CALCULATED.3IONS-SCNC: 9 MEQ/L (ref 9–15)
BASOPHILS # BLD: 0 K/UL (ref 0–0.1)
BASOPHILS NFR BLD: 0.7 % (ref 0.1–1.2)
BUN SERPL-MCNC: 41 MG/DL (ref 8–23)
CALCIUM SERPL-MCNC: 8.6 MG/DL (ref 8.5–9.9)
CHLORIDE SERPL-SCNC: 108 MEQ/L (ref 95–107)
CO2 SERPL-SCNC: 23 MEQ/L (ref 20–31)
CREAT SERPL-MCNC: 1.23 MG/DL (ref 0.5–0.9)
EKG ATRIAL RATE: 55 BPM
EKG P AXIS: 69 DEGREES
EKG P-R INTERVAL: 150 MS
EKG Q-T INTERVAL: 458 MS
EKG QRS DURATION: 64 MS
EKG QTC CALCULATION (BAZETT): 438 MS
EKG R AXIS: 20 DEGREES
EKG T AXIS: 29 DEGREES
EKG VENTRICULAR RATE: 55 BPM
EOSINOPHIL # BLD: 0.2 K/UL (ref 0–0.4)
EOSINOPHIL NFR BLD: 3.2 % (ref 0.7–5.8)
ERYTHROCYTE [DISTWIDTH] IN BLOOD BY AUTOMATED COUNT: 13.3 % (ref 11.7–14.4)
GLUCOSE SERPL-MCNC: 89 MG/DL (ref 70–99)
HCT VFR BLD AUTO: 32 % (ref 37–47)
HGB BLD-MCNC: 10.1 G/DL (ref 11.2–15.7)
IMM GRANULOCYTES # BLD: 0 K/UL
IMM GRANULOCYTES NFR BLD: 0.4 %
LYMPHOCYTES # BLD: 2.6 K/UL (ref 1.2–3.7)
LYMPHOCYTES NFR BLD: 47.4 %
MCH RBC QN AUTO: 30.6 PG (ref 25.6–32.2)
MCHC RBC AUTO-ENTMCNC: 31.6 % (ref 32.2–35.5)
MCV RBC AUTO: 97 FL (ref 79.4–94.8)
MONOCYTES # BLD: 0.7 K/UL (ref 0.2–0.9)
MONOCYTES NFR BLD: 12.4 % (ref 4.7–12.5)
NEUTROPHILS # BLD: 2 K/UL (ref 1.6–6.1)
NEUTS SEG NFR BLD: 35.9 % (ref 34–71.1)
PLATELET # BLD AUTO: 160 K/UL (ref 182–369)
POTASSIUM SERPL-SCNC: 4.4 MEQ/L (ref 3.4–4.9)
RBC # BLD AUTO: 3.3 M/UL (ref 3.93–5.22)
SODIUM SERPL-SCNC: 140 MEQ/L (ref 135–144)
WBC # BLD AUTO: 5.6 K/UL (ref 4–10)

## 2023-03-24 PROCEDURE — 6360000002 HC RX W HCPCS: Performed by: INTERNAL MEDICINE

## 2023-03-24 PROCEDURE — 85025 COMPLETE CBC W/AUTO DIFF WBC: CPT

## 2023-03-24 PROCEDURE — 6370000000 HC RX 637 (ALT 250 FOR IP): Performed by: INTERNAL MEDICINE

## 2023-03-24 PROCEDURE — 2700000000 HC OXYGEN THERAPY PER DAY

## 2023-03-24 PROCEDURE — 2580000003 HC RX 258: Performed by: INTERNAL MEDICINE

## 2023-03-24 PROCEDURE — 96361 HYDRATE IV INFUSION ADD-ON: CPT

## 2023-03-24 PROCEDURE — G0378 HOSPITAL OBSERVATION PER HR: HCPCS

## 2023-03-24 PROCEDURE — 96372 THER/PROPH/DIAG INJ SC/IM: CPT

## 2023-03-24 PROCEDURE — 93010 ELECTROCARDIOGRAM REPORT: CPT | Performed by: INTERNAL MEDICINE

## 2023-03-24 PROCEDURE — 36415 COLL VENOUS BLD VENIPUNCTURE: CPT

## 2023-03-24 PROCEDURE — 96374 THER/PROPH/DIAG INJ IV PUSH: CPT

## 2023-03-24 PROCEDURE — 80048 BASIC METABOLIC PNL TOTAL CA: CPT

## 2023-03-24 RX ORDER — AMANTADINE HYDROCHLORIDE 100 MG/1
100 CAPSULE, GELATIN COATED ORAL DAILY
Status: DISCONTINUED | OUTPATIENT
Start: 2023-03-25 | End: 2023-03-25 | Stop reason: HOSPADM

## 2023-03-24 RX ORDER — LORAZEPAM 2 MG/ML
1 INJECTION INTRAMUSCULAR EVERY 6 HOURS PRN
Status: DISCONTINUED | OUTPATIENT
Start: 2023-03-24 | End: 2023-03-25 | Stop reason: HOSPADM

## 2023-03-24 RX ADMIN — DONEPEZIL HYDROCHLORIDE 10 MG: 5 TABLET, FILM COATED ORAL at 08:40

## 2023-03-24 RX ADMIN — OXYBUTYNIN CHLORIDE 10 MG: 5 TABLET, EXTENDED RELEASE ORAL at 08:39

## 2023-03-24 RX ADMIN — LORAZEPAM 1 MG: 2 INJECTION INTRAMUSCULAR; INTRAVENOUS at 19:07

## 2023-03-24 RX ADMIN — ATORVASTATIN CALCIUM 10 MG: 10 TABLET, FILM COATED ORAL at 08:39

## 2023-03-24 RX ADMIN — ENOXAPARIN SODIUM 30 MG: 100 INJECTION SUBCUTANEOUS at 08:39

## 2023-03-24 RX ADMIN — CARBIDOPA AND LEVODOPA 1 TABLET: 25; 100 TABLET ORAL at 20:29

## 2023-03-24 RX ADMIN — Medication 10 ML: at 08:40

## 2023-03-24 RX ADMIN — SODIUM CHLORIDE: 9 INJECTION, SOLUTION INTRAVENOUS at 00:24

## 2023-03-24 RX ADMIN — ASPIRIN 81 MG CHEWABLE TABLET 81 MG: 81 TABLET CHEWABLE at 08:39

## 2023-03-24 RX ADMIN — CARBIDOPA AND LEVODOPA 1 TABLET: 25; 100 TABLET ORAL at 08:39

## 2023-03-24 RX ADMIN — AMANTADINE HYDROCHLORIDE 100 MG: 100 CAPSULE ORAL at 00:05

## 2023-03-24 RX ADMIN — METOPROLOL SUCCINATE 12.5 MG: 25 TABLET, EXTENDED RELEASE ORAL at 08:40

## 2023-03-24 NOTE — DISCHARGE INSTR - COC
Continuity of Care Form    Patient Name: Lesley Leong   :  [id number]  MRN:  [id number]    Admit date:  3/23/2023  Discharge date:  3/25/2023    Code Status Order: DNR-CC   Advance Directives:     Admitting Physician:  Renee Viera MD  PCP: Charly Aguilar DO    Discharging Nurse: [facility]/Room#: 0204/0204-01  Discharging Unit Phone Number: [phone]    Emergency Contact:   Extended Emergency Contact Information  Primary Emergency Contact: Yaneli Hager  Address: [address] [place] [address] Phone: [phone]  Mobile Phone: [phone]  Relation: Spouse    Past Surgical History:  Past Surgical History:   Procedure Laterality Date    BACK SURGERY  [id number]    Laminectomy L5? CERVICAL DISCECTOMY      pt denies surgery    COLONOSCOPY      having Mon 2011-neg. hx polyps    COLONOSCOPY      maternal colon ca; pt has hx polyps.     SHOULDER SURGERY      right shoulder    TOE SURGERY      joint replacement, great toe, right foot    TUBAL LIGATION      UPPER GASTROINTESTINAL ENDOSCOPY  12/18/15    w/bx,dilation        Immunization History:   Immunization History   Administered Date(s) Administered    Influenza Virus Vaccine 10/01/2010, 2015    Influenza, High Dose (Fluzone 65 yrs and older) 2016    Pneumococcal, PCV-13, PREVNAR 13, (age 6w+), IM, 0.5mL 2016    Pneumococcal, PPSV23, PNEUMOVAX 23, (age 2y+), SC/IM, 0.5mL 2006, 10/02/2015    TDaP, ADACEL (age 10y-63y), BOOSTRIX (age 10y+), IM, 0.5mL 2012       Active Problems:  Patient Active Problem List   Diagnosis Code    History of bone density study Z92.89    Depression with anxiety F41.8    Allergic rhinitis J30.9    HTN (hypertension) I10    Insomnia G47.00    Colon polyps K63.5    Memory loss R41.3    Raynaud's phenomenon I73.00    Osteoarthrosis M19.90    History of transient ischemic attack (TIA) Z86.73    Heart murmur R01.1 Video (Video Swallowing Test): not done    Treatments at the Time of Hospital Discharge:   Respiratory Treatments: n/a  Oxygen Therapy:  is on oxygen at 2 L/min per nasal cannula. Ventilator:    - No ventilator support    Rehab Therapies: n/a  Weight Bearing Status/Restrictions: No weight bearing restrictions  Other Medical Equipment (for information only, NOT a DME order):  wheelchair, bedside commode, and hospital bed  Other Treatments: none    Patient's personal belongings (please select all that are sent with patient):  Glasses    RN SIGNATURE:  Electronically signed by Omid Vargas RN on 3/25/23 at 11:59 AM EDT    CASE MANAGEMENT/SOCIAL WORK SECTION    Inpatient Status Date: 3/23/23    Readmission Risk Assessment Score:  Readmission Risk              Risk of Unplanned Readmission:  [address]   Address: [address] [place] [address]   Phone: [phone]      / signature: Electronically signed by JEISON Freitas on 3/24/23 at 3:21 PM EDT    PHYSICIAN SECTION    Prognosis: {Prognosis:8833522695}    Condition at Discharge: [address] [place] [address] Patient Condition:[id number]}    Rehab Potential (if transferring to Rehab): {Prognosis:7174819636}    Recommended Labs or Other Treatments After Discharge: ***    Physician Certification: I certify the above information and transfer of Yinka Sorto  is necessary for the continuing treatment of the diagnosis listed and that she requires {Admit to Appropriate Level of Care:12433} for {GREATER/LESS:530109878} 30 days.      Update Admission H&P: {CHP DME Changes in QINGR:937969517}    PHYSICIAN SIGNATURE:  {Esignature:[id number]}

## 2023-03-24 NOTE — ED NOTES
Called Supervisor, let her know patient is ready to be transported to floor.      [facility]  03/23/23 2006

## 2023-03-24 NOTE — PROGRESS NOTES
Renal Adjustment Per Protocol: amantadine changed to 100 mg daily based on    Recent Labs     03/24/23  0536   CREATININE 1.23*   Estimated Creatinine Clearance: 35 mL/min (A) (based on SCr of 1.23 mg/dL (H)). Neto Cabrera

## 2023-03-24 NOTE — PROGRESS NOTES
Chart reviewed. Family meeting with [facility] on this date. This  met with [facility], along with patient's spouse and sister after hospice informational meeting. Family reports plan is for patient to DC home on [facility] services. Kar Gamble reports plan to have DME delivered to patient's home this evening. Spouse and sister plan to make arrangements with private care agency to assist with patient's care at home. This  along with hospice nurse provided family with list of private home care resources. Kar Yahairajeane reports that transportation has been arranged to pick patient up from [facility] on Sat. 3/25 at 10:30am to transport patient home, and then hospice nurse will make home visit. Family agreeable with Sat. 3/25 DC. Family appears to be preparing for end of life issues and reports that comfort care is primary care goal.  SS provided support through active listening, validation, and providing resources. Dr. Giulia Tobar updated and agreeable with DC plan.   SS to continue to follow as needed while patient is at [facility]

## 2023-03-24 NOTE — PROGRESS NOTES
Pt admitted to room 204. Pt alert and oriented to self. Pt is confused at times. Pt in bed at this time with bed alarm on. Pt call light in reach. Pt vitals stable. Pt is a dnrcc. Pt  stated she does not want any life saving measures. Pt  states plan is to send pt home on hospice. Pt denies any pain. Pt has a purewick in place due to incontinence. Will continue to monitor pt.   Electronically signed by Maninder Hendrix RN on 3/23/2023 at 11:07 PM

## 2023-03-24 NOTE — PLAN OF CARE
Problem: Discharge Planning  Goal: Discharge to home or other facility with appropriate resources  Outcome: Progressing  Flowsheets (Taken 3/23/2023 2102)  Discharge to home or other facility with appropriate resources:   Identify barriers to discharge with patient and caregiver   Arrange for needed discharge resources and transportation as appropriate   Identify discharge learning needs (meds, wound care, etc)   Arrange for interpreters to assist at discharge as needed   Refer to discharge planning if patient needs post-hospital services based on physician order or complex needs related to functional status, cognitive ability or social support system     Problem: Safety - Adult  Goal: Free from fall injury  Outcome: Progressing     Problem: ABCDS Injury Assessment  Goal: Absence of physical injury  Outcome: Progressing     Problem: Skin/Tissue Integrity  Goal: Absence of new skin breakdown  Description: 1. Monitor for areas of redness and/or skin breakdown  2. Assess vascular access sites hourly  3. Every 4-6 hours minimum:  Change oxygen saturation probe site  4. Every 4-6 hours:  If on nasal continuous positive airway pressure, respiratory therapy assess nares and determine need for appliance change or resting period.   Outcome: Progressing

## 2023-03-24 NOTE — CARE COORDINATION
Spoke to Zohreh at [employer]. Meeting was arranged for  am here at [facility]. This CM also spoke to patients spouse . All questions were answered. CM to follow.

## 2023-03-24 NOTE — H&P
Hospital Medicine History & Physical      PCP: Liya Cheung DO    Date of Admission: 3/23/2023    Date of Service: 3/24/23      Chief Complaint:  dyspnea/desaturation      History Of Present Illness:  69 y.o. female who presented to [facility] with above complains. She presents to the emergency department via EMS from the  [facility] for hypoxia. Nursing home reports that pt was recently treated at [facility] for fall and lumbar fractures. They incidentally found a small, segmental R pulmonary embolism. Her code status was changed to [facility]. yesterday  the nursing home reports that pt c/o SOB, sp02 was in the [de-identified] and family requested ED evaluation. Pt arrived to ED 89-90% on room air. She is a poor historian due to dementia but she denies pain and denies difficulty breathing at this time. After initial stabilization she was admitted for further management       Past Medical History:          Diagnosis Date    Allergic rhinitis 10/24/2011    At risk for bone density loss 2009    hx normal    Back pain     Broken ankle 2/2015    left    Colon polyps     colonoscopy 2008 (polyps) and 1/12 neg. Cough 5/11/2015    DJD (degenerative joint disease) of cervical spine     also r shoulder    FH: colon cancer maternal age 68    Gout     on chronic Mobic! DC'ed mobic 12/11. Heart murmur     lifelong-echo ok    History of bone density study approx 2009    Hyperlipidemia     2009, hx good control. Hypertension     age 28, hx good control    Knee pain     right knee    Memory loss     onset august, 2011    Neuropathy     feet left > right    Osteoarthrosis 2/2/2012    Raynaud disease     Raynaud's phenomenon 2/2/2012    TIA (transient ischemic attack) 2009    transient los vision uscarotids, CT OK. Rx asa c no reoccurence. Past Surgical History:          Procedure Laterality Date    BACK SURGERY  [id number]    Laminectomy L5?     CERVICAL DISCECTOMY      pt denies surgery    COLONOSCOPY      having Mon 5' 5\" (1.651 m)   Wt 145 lb 14.4 oz (66.2 kg)   LMP 07/29/2001   SpO2 91%   BMI 24.28 kg/m²     General appearance:  No apparent distress, appears stated age and cooperative. HEENT:  Normal cephalic, atraumatic without obvious deformity. Pupils equal, round, and reactive to light. Extra ocular muscles intact. Conjunctivae/corneas clear. Neck: Supple, with full range of motion. No jugular venous distention. Trachea midline. Respiratory:  Normal respiratory effort. Clear to auscultation, bilaterally without Rales/Wheezes/Rhonchi. Cardiovascular:  Regular rate and rhythm with normal S1/S2 without murmurs, rubs or gallops. Abdomen: Soft, non-tender, non-distended with normal bowel sounds. Musculoskeletal:  No clubbing, cyanosis or edema bilaterally. Full range of motion without deformity. Skin: Skin color, texture, turgor normal.  No rashes or lesions. Neurologic:  Neurovascularly intact without any focal sensory/motor deficits. Cranial nerves: II-XII intact, grossly non-focal.  Psychiatric: partially Alert and oriented,   Capillary Refill: Brisk,< 3 seconds   Peripheral Pulses: +2 palpable, equal bilaterally       Labs:     Recent Labs     03/23/23 1813 03/24/23  0536   WBC 7.1 5.6   HGB 11.6 10.1*   HCT 36.5* 32.0*    160*     Recent Labs     03/23/23  1813 03/24/23  0536    140   K 4.4 4.4    108*   CO2 27 23   BUN 44* 41*   CREATININE 1.81* 1.23*   CALCIUM 9.2 8.6     Recent Labs     03/23/23  1813   AST 30   ALT 27   BILITOT 0.5   ALKPHOS 130     No results for input(s): INR in the last 72 hours.   Recent Labs     03/23/23 1813   TROPONINI 0.143*       Urinalysis:      Lab Results   Component Value Date/Time    NITRU NEGATIVE 01/13/2021 01:40 PM    WBCUA 0-2 04/19/2015 09:15 PM    BACTERIA Rare 04/19/2015 09:15 PM    RBCUA 0-2 04/19/2015 09:15 PM    BLOODU NEGATIVE 01/13/2021 01:40 PM    SPECGRAV 1.017 08/11/2016 07:35 PM    GLUCOSEU Negative 08/11/2016 07:35 PM       Radiology:

## 2023-03-25 VITALS
RESPIRATION RATE: 18 BRPM | OXYGEN SATURATION: 100 % | SYSTOLIC BLOOD PRESSURE: 141 MMHG | DIASTOLIC BLOOD PRESSURE: 65 MMHG | WEIGHT: 145.9 LBS | HEART RATE: 69 BPM | BODY MASS INDEX: 24.31 KG/M2 | TEMPERATURE: 97.5 F | HEIGHT: 65 IN

## 2023-03-25 PROCEDURE — 96376 TX/PRO/DX INJ SAME DRUG ADON: CPT

## 2023-03-25 PROCEDURE — G0378 HOSPITAL OBSERVATION PER HR: HCPCS

## 2023-03-25 PROCEDURE — 6360000002 HC RX W HCPCS: Performed by: INTERNAL MEDICINE

## 2023-03-25 PROCEDURE — 2580000003 HC RX 258: Performed by: INTERNAL MEDICINE

## 2023-03-25 PROCEDURE — 6370000000 HC RX 637 (ALT 250 FOR IP): Performed by: INTERNAL MEDICINE

## 2023-03-25 RX ADMIN — CARBIDOPA AND LEVODOPA 1 TABLET: 25; 100 TABLET ORAL at 08:54

## 2023-03-25 RX ADMIN — LORAZEPAM 1 MG: 2 INJECTION INTRAMUSCULAR; INTRAVENOUS at 12:47

## 2023-03-25 RX ADMIN — OXYBUTYNIN CHLORIDE 10 MG: 5 TABLET, EXTENDED RELEASE ORAL at 08:54

## 2023-03-25 RX ADMIN — ASPIRIN 81 MG CHEWABLE TABLET 81 MG: 81 TABLET CHEWABLE at 08:54

## 2023-03-25 RX ADMIN — AMANTADINE HYDROCHLORIDE 100 MG: 100 CAPSULE ORAL at 08:54

## 2023-03-25 RX ADMIN — ATORVASTATIN CALCIUM 10 MG: 10 TABLET, FILM COATED ORAL at 08:55

## 2023-03-25 RX ADMIN — DONEPEZIL HYDROCHLORIDE 10 MG: 5 TABLET, FILM COATED ORAL at 08:53

## 2023-03-25 RX ADMIN — METOPROLOL SUCCINATE 12.5 MG: 25 TABLET, EXTENDED RELEASE ORAL at 08:54

## 2023-03-25 RX ADMIN — Medication 10 ML: at 08:55

## 2023-03-25 NOTE — PROGRESS NOTES
Patient is alert and oriented to person, but disoriented to place, situation, and time, and is confused. Able to take medications crushed in pudding. Has an IV in the left arm that is flushed. Safety maintained at this time, bed is low and locked, bed alarm is on. Call light and belongings are in reach.

## 2023-03-25 NOTE — PROGRESS NOTES
Patient left at this time via physicians ambulance to home with hospice. Report given to physicians with updated vitals. Hospice aware of patient leaving for home and  is aware patient left facility. Discharge instructions sent, IV removed.  Patient received ativan for the ride per family request.

## 2023-03-25 NOTE — DISCHARGE SUMMARY
Discharge Summary    Patient:  Anna Ernst  YOB: 1945    MRN: [id number]   Acct: [de-identified]    Primary Care Physician: Marcela Aguilar DO    Admit date:  3/23/2023    Discharge date:   03/25/23      Discharge Diagnoses:   Acute hypoxemic respiratory failure [facility])  Principal Problem:    Acute hypoxemic respiratory failure ([address]  Resolved Problems:    * No resolved hospital problems. *      Admitted for: [facility] Course: patient was admitted form local SNF with dyspnea/desaturation, was found to have TIMOTHY/dehydration/NSTEMI, suspected progression in known PE due to not getting anticoagulation in the setting of frequent falls due to dementia. After admission her work up and treatment were limited due to code status DNR CC. After completing her stay she will be DC home under hospice care per her  request     Consultants:  hospice    Discharge Medications:       Medication List        CHANGE how you take these medications      vitamin B-12 1000 MCG tablet  Commonly known as: CYANOCOBALAMIN  What changed: Another medication with the same name was removed. Continue taking this medication, and follow the directions you see here. CONTINUE taking these medications      acetaminophen 325 MG tablet  Commonly known as: TYLENOL     amantadine 100 MG capsule  Commonly known as: SYMMETREL     aspirin 81 MG tablet     atorvastatin 10 MG tablet  Commonly known as: LIPITOR     carbidopa-levodopa  MG per tablet  Commonly known as: SINEMET     cyclobenzaprine 5 MG tablet  Commonly known as: FLEXERIL     donepezil 10 MG tablet  Commonly known as: ARICEPT     fluocinonide 0.05 % cream  Commonly known as: LIDEX  Apply topically 2 times daily.      Melatonin 10 MG Tabs     methocarbamol 500 MG tablet  Commonly known as: ROBAXIN     metoprolol succinate 25 MG extended release tablet  Commonly known as: TOPROL XL     omeprazole 20 MG delayed release capsule  Commonly known as: PRILOSEC     oxybutynin 10 MG extended release tablet  Commonly known as: DITROPAN-XL  Take 1 tablet by mouth daily     polyethylene glycol 17 GM/SCOOP Powd powder  Commonly known as: MIRALAX     vitamin B-6 100 MG tablet  Commonly known as: PYRIDOXINE     Vitamin D3 25 MCG Tabs            STOP taking these medications      losartan 25 MG tablet  Commonly known as: COZAAR     sertraline 50 MG tablet  Commonly known as: ZOLOFT     telmisartan-amLODIPine 40-10 MG Tabs tablet  Commonly known as: TWYNSTA            ASK your doctor about these medications      gabapentin 300 MG capsule  Commonly known as: NEURONTIN  Take 1 capsule by mouth 3 times daily              Physical Exam:    Vitals:  Vitals:    03/24/23 0044 03/24/23 0547 03/24/23 1538 03/24/23 2008   BP:  111/74  132/69   Pulse:  90  (!) 102   Resp:  16  18   Temp:  97.5 °F (36.4 °C)  97.8 °F (36.6 °C)   TempSrc:  Oral  Oral   SpO2: 97% 91% 95%    Weight:       Height:         Weight: Weight: 145 lb 14.4 oz (66.2 kg)     24 hour intake/output:  Intake/Output Summary (Last 24 hours) at 3/25/2023 7097  Last data filed at 3/24/2023 1737  Gross per 24 hour   Intake 300 ml   Output --   Net 300 ml       General appearance - partially alert, well appearing, and in no distress  Chest - clear to auscultation, no wheezes, rales or rhonchi, symmetric air entry  Heart - normal rate, regular rhythm, normal S1, S2, no murmurs, rubs, clicks or gallops  Abdomen - soft, nontender, nondistended, no masses or organomegaly  Obese: No; Protuberant: No   Neurological - partially alert, oriented,   Extremities - peripheral pulses normal, no pedal edema, no clubbing or cyanosis  Skin - normal coloration and turgor, no rashes, no suspicious skin lesions noted    Procedures:      Diagnostic Test:      Radiology reports as per the Radiologist  Radiology: XR CHEST PORTABLE    Result Date: 3/23/2023  EXAMINATION: ONE XRAY VIEW OF THE CHEST 3/23/2023 5:50 pm COMPARISON: None.  HISTORY:

## 2023-03-25 NOTE — PLAN OF CARE
Pt is in bed w/  at her bedside. She is A/O/ x 1, Dementia hx., Avs Assist. Takes her po medications crushed in pudding. No c/o pain at this time. Last BM 03/24/2023, no c/o abd pain, constipation, or diarrhea. She has a 20 ga IV in her Left AC that is clean, dry, and intact. At this time , Pt is in bed watching Tv. Call light is w/in reach. Will continue to monitor.